# Patient Record
Sex: FEMALE | Race: BLACK OR AFRICAN AMERICAN | NOT HISPANIC OR LATINO | URBAN - METROPOLITAN AREA
[De-identification: names, ages, dates, MRNs, and addresses within clinical notes are randomized per-mention and may not be internally consistent; named-entity substitution may affect disease eponyms.]

---

## 2020-02-02 ENCOUNTER — EMERGENCY (EMERGENCY)
Facility: HOSPITAL | Age: 35
LOS: 1 days | Discharge: HOME | End: 2020-02-02
Attending: STUDENT IN AN ORGANIZED HEALTH CARE EDUCATION/TRAINING PROGRAM
Payer: SELF-PAY

## 2020-02-02 VITALS
OXYGEN SATURATION: 100 % | HEART RATE: 110 BPM | SYSTOLIC BLOOD PRESSURE: 128 MMHG | TEMPERATURE: 103 F | DIASTOLIC BLOOD PRESSURE: 69 MMHG | HEIGHT: 59 IN | WEIGHT: 93.92 LBS | RESPIRATION RATE: 18 BRPM

## 2020-02-02 PROCEDURE — 99284 EMERGENCY DEPT VISIT MOD MDM: CPT

## 2020-02-02 PROCEDURE — 99053 MED SERV 10PM-8AM 24 HR FAC: CPT

## 2020-02-03 LAB
FLU A RESULT: SIGNIFICANT CHANGE UP
FLU A RESULT: SIGNIFICANT CHANGE UP
FLUAV AG NPH QL: SIGNIFICANT CHANGE UP
FLUBV AG NPH QL: SIGNIFICANT CHANGE UP
HCG SERPL-ACNC: <4 MIU/ML — SIGNIFICANT CHANGE UP
RSV RESULT: SIGNIFICANT CHANGE UP
RSV RNA RESP QL NAA+PROBE: SIGNIFICANT CHANGE UP

## 2020-02-03 PROCEDURE — 99284 EMERGENCY DEPT VISIT MOD MDM: CPT | Mod: 25

## 2020-02-03 PROCEDURE — 96374 THER/PROPH/DIAG INJ IV PUSH: CPT

## 2020-02-03 PROCEDURE — 96375 TX/PRO/DX INJ NEW DRUG ADDON: CPT

## 2020-02-03 PROCEDURE — 84702 CHORIONIC GONADOTROPIN TEST: CPT

## 2020-02-03 PROCEDURE — 36415 COLL VENOUS BLD VENIPUNCTURE: CPT

## 2020-02-03 PROCEDURE — 87631 RESP VIRUS 3-5 TARGETS: CPT

## 2020-02-03 RX ORDER — SODIUM CHLORIDE 9 MG/ML
1000 INJECTION INTRAMUSCULAR; INTRAVENOUS; SUBCUTANEOUS ONCE
Refills: 0 | Status: COMPLETED | OUTPATIENT
Start: 2020-02-03 | End: 2020-02-03

## 2020-02-03 RX ORDER — ACETAMINOPHEN 500 MG
975 TABLET ORAL ONCE
Refills: 0 | Status: COMPLETED | OUTPATIENT
Start: 2020-02-03 | End: 2020-02-03

## 2020-02-03 RX ORDER — KETOROLAC TROMETHAMINE 30 MG/ML
30 SYRINGE (ML) INJECTION ONCE
Refills: 0 | Status: DISCONTINUED | OUTPATIENT
Start: 2020-02-03 | End: 2020-02-03

## 2020-02-03 RX ORDER — ONDANSETRON 8 MG/1
4 TABLET, FILM COATED ORAL ONCE
Refills: 0 | Status: COMPLETED | OUTPATIENT
Start: 2020-02-03 | End: 2020-02-03

## 2020-02-03 RX ADMIN — ONDANSETRON 4 MILLIGRAM(S): 8 TABLET, FILM COATED ORAL at 01:53

## 2020-02-03 RX ADMIN — Medication 30 MILLIGRAM(S): at 01:53

## 2020-02-03 RX ADMIN — SODIUM CHLORIDE 1000 MILLILITER(S): 9 INJECTION INTRAMUSCULAR; INTRAVENOUS; SUBCUTANEOUS at 01:53

## 2020-02-03 RX ADMIN — Medication 975 MILLIGRAM(S): at 01:53

## 2020-02-03 NOTE — ED PROVIDER NOTE - CLINICAL SUMMARY MEDICAL DECISION MAKING FREE TEXT BOX
labs reviewed. Pt with likely viral syndrome. Feeling better after toradol, tylenol, ivf.  PT reassured and instructed to f/up with pcp in 1-2 days. instructed to return for worsening fever, worsening vomiting, or any other concerns.  Pt given a copy of all results and instructed to f/up with pcp regarding any abnormal results.

## 2020-02-03 NOTE — ED PROVIDER NOTE - NS ED ROS FT
(+)N/V, (+)Myalgia. No fever/chills, No photophobia/eye pain/changes in vision, No ear pain/sore throat/dysphagia, No chest pain/palpitations, no SOB/cough/wheeze/stridor, No abdominal pain, No diarrhea, no dysuria/frequency/discharge, No neck/back pain, no rash, no changes in neurological status/function.

## 2020-02-03 NOTE — ED PROVIDER NOTE - OBJECTIVE STATEMENT
33 y/o F pt with significant PMHx of presents to the ED c/o diffuse myalgia, N/V and a HA. Pt states she had a total of 3 episodes of emesis and her whole body feels "sore." Pt is unable to sit up because it makes her feel as if she is going to vomit. Denies fever or diarrhea. No further complaints at this time.

## 2020-02-03 NOTE — ED PROVIDER NOTE - PHYSICAL EXAMINATION
Constitutional - well-developed; well nourished.   Head - NCAT. Airway patent.   Eyes - PERRL.   CV - Tachycardic. no murmur. no edema.   Pulm - CTAB.   Abd - soft, nt. no rebound. no guarding.   Neuro - A&Ox3. strength 5/5 x4. sensation intact x4. normal gait.   Skin - No rash.   MSK - normal ROM. Negative Kernig or Brudzinski sign. Constitutional - well-developed; well nourished.   Head - NCAT. Airway patent.   Eyes - PERRL.   CV - Tachycardic. no murmur. no edema.   Pulm - CTAB.   Abd - soft, nt. no rebound. no guarding.   Neuro - A&Ox3. strength 5/5 x4. sensation intact x4. normal gait.   Skin - No rash.   MSK - normal ROM. Negative Kernig and Brudzinski sign.

## 2020-02-03 NOTE — ED PROVIDER NOTE - PATIENT PORTAL LINK FT
You can access the FollowMyHealth Patient Portal offered by [facility] by registering at the following website: [email or web address]. By joining [employer]’s FollowMyHealth portal, you will also be able to view your health information using other applications (apps) compatible with our system.

## 2022-02-02 NOTE — ED ADULT NURSE NOTE - HIV INFORMATION PROVIDED
No (1) Mild-to-moderate dysarthria; patient slurs at least some words and, at worst, can be understood with some difficulty

## 2023-12-20 ENCOUNTER — EMERGENCY (EMERGENCY)
Facility: HOSPITAL | Age: 38
LOS: 0 days | Discharge: HOME | End: 2023-12-20
Attending: EMERGENCY MEDICINE
Payer: COMMERCIAL

## 2023-12-20 VITALS
SYSTOLIC BLOOD PRESSURE: 135 MMHG | TEMPERATURE: 98 F | WEIGHT: 104.94 LBS | OXYGEN SATURATION: 100 % | HEART RATE: 65 BPM | RESPIRATION RATE: 18 BRPM | DIASTOLIC BLOOD PRESSURE: 81 MMHG | HEIGHT: 59 IN

## 2023-12-20 VITALS
TEMPERATURE: 98 F | OXYGEN SATURATION: 100 % | RESPIRATION RATE: 16 BRPM | DIASTOLIC BLOOD PRESSURE: 65 MMHG | HEART RATE: 93 BPM | SYSTOLIC BLOOD PRESSURE: 109 MMHG

## 2023-12-20 DIAGNOSIS — R50.9 FEVER, UNSPECIFIED: ICD-10-CM

## 2023-12-20 DIAGNOSIS — Z20.822 CONTACT WITH AND (SUSPECTED) EXPOSURE TO COVID-19: ICD-10-CM

## 2023-12-20 DIAGNOSIS — R11.2 NAUSEA WITH VOMITING, UNSPECIFIED: ICD-10-CM

## 2023-12-20 DIAGNOSIS — R09.81 NASAL CONGESTION: ICD-10-CM

## 2023-12-20 DIAGNOSIS — B34.9 VIRAL INFECTION, UNSPECIFIED: ICD-10-CM

## 2023-12-20 PROBLEM — Z78.9 OTHER SPECIFIED HEALTH STATUS: Chronic | Status: ACTIVE | Noted: 2020-02-03

## 2023-12-20 LAB
ALBUMIN SERPL ELPH-MCNC: 3.5 G/DL — SIGNIFICANT CHANGE UP (ref 3.3–5)
ALBUMIN SERPL ELPH-MCNC: 3.5 G/DL — SIGNIFICANT CHANGE UP (ref 3.3–5)
ALP SERPL-CCNC: 61 U/L — SIGNIFICANT CHANGE UP (ref 40–120)
ALP SERPL-CCNC: 61 U/L — SIGNIFICANT CHANGE UP (ref 40–120)
ALT FLD-CCNC: 17 U/L — SIGNIFICANT CHANGE UP (ref 12–78)
ALT FLD-CCNC: 17 U/L — SIGNIFICANT CHANGE UP (ref 12–78)
ANION GAP SERPL CALC-SCNC: 4 MMOL/L — LOW (ref 5–17)
ANION GAP SERPL CALC-SCNC: 4 MMOL/L — LOW (ref 5–17)
AST SERPL-CCNC: 13 U/L — LOW (ref 15–37)
AST SERPL-CCNC: 13 U/L — LOW (ref 15–37)
BASOPHILS # BLD AUTO: 0.04 K/UL — SIGNIFICANT CHANGE UP (ref 0–0.2)
BASOPHILS # BLD AUTO: 0.04 K/UL — SIGNIFICANT CHANGE UP (ref 0–0.2)
BASOPHILS NFR BLD AUTO: 0.5 % — SIGNIFICANT CHANGE UP (ref 0–2)
BASOPHILS NFR BLD AUTO: 0.5 % — SIGNIFICANT CHANGE UP (ref 0–2)
BILIRUB SERPL-MCNC: 0.5 MG/DL — SIGNIFICANT CHANGE UP (ref 0.2–1.2)
BILIRUB SERPL-MCNC: 0.5 MG/DL — SIGNIFICANT CHANGE UP (ref 0.2–1.2)
BUN SERPL-MCNC: 7 MG/DL — SIGNIFICANT CHANGE UP (ref 7–23)
BUN SERPL-MCNC: 7 MG/DL — SIGNIFICANT CHANGE UP (ref 7–23)
CALCIUM SERPL-MCNC: 8.8 MG/DL — SIGNIFICANT CHANGE UP (ref 8.5–10.1)
CALCIUM SERPL-MCNC: 8.8 MG/DL — SIGNIFICANT CHANGE UP (ref 8.5–10.1)
CHLORIDE SERPL-SCNC: 105 MMOL/L — SIGNIFICANT CHANGE UP (ref 96–108)
CHLORIDE SERPL-SCNC: 105 MMOL/L — SIGNIFICANT CHANGE UP (ref 96–108)
CO2 SERPL-SCNC: 24 MMOL/L — SIGNIFICANT CHANGE UP (ref 22–31)
CO2 SERPL-SCNC: 24 MMOL/L — SIGNIFICANT CHANGE UP (ref 22–31)
CREAT SERPL-MCNC: 0.66 MG/DL — SIGNIFICANT CHANGE UP (ref 0.5–1.3)
CREAT SERPL-MCNC: 0.66 MG/DL — SIGNIFICANT CHANGE UP (ref 0.5–1.3)
EGFR: 116 ML/MIN/1.73M2 — SIGNIFICANT CHANGE UP
EGFR: 116 ML/MIN/1.73M2 — SIGNIFICANT CHANGE UP
EOSINOPHIL # BLD AUTO: 0.08 K/UL — SIGNIFICANT CHANGE UP (ref 0–0.5)
EOSINOPHIL # BLD AUTO: 0.08 K/UL — SIGNIFICANT CHANGE UP (ref 0–0.5)
EOSINOPHIL NFR BLD AUTO: 1 % — SIGNIFICANT CHANGE UP (ref 0–6)
EOSINOPHIL NFR BLD AUTO: 1 % — SIGNIFICANT CHANGE UP (ref 0–6)
FLUAV AG NPH QL: SIGNIFICANT CHANGE UP
FLUAV AG NPH QL: SIGNIFICANT CHANGE UP
FLUBV AG NPH QL: SIGNIFICANT CHANGE UP
FLUBV AG NPH QL: SIGNIFICANT CHANGE UP
GLUCOSE SERPL-MCNC: 82 MG/DL — SIGNIFICANT CHANGE UP (ref 70–99)
GLUCOSE SERPL-MCNC: 82 MG/DL — SIGNIFICANT CHANGE UP (ref 70–99)
HCT VFR BLD CALC: 36.8 % — SIGNIFICANT CHANGE UP (ref 34.5–45)
HCT VFR BLD CALC: 36.8 % — SIGNIFICANT CHANGE UP (ref 34.5–45)
HGB BLD-MCNC: 13.6 G/DL — SIGNIFICANT CHANGE UP (ref 11.5–15.5)
HGB BLD-MCNC: 13.6 G/DL — SIGNIFICANT CHANGE UP (ref 11.5–15.5)
IMM GRANULOCYTES NFR BLD AUTO: 0.3 % — SIGNIFICANT CHANGE UP (ref 0–0.9)
IMM GRANULOCYTES NFR BLD AUTO: 0.3 % — SIGNIFICANT CHANGE UP (ref 0–0.9)
LYMPHOCYTES # BLD AUTO: 1.47 K/UL — SIGNIFICANT CHANGE UP (ref 1–3.3)
LYMPHOCYTES # BLD AUTO: 1.47 K/UL — SIGNIFICANT CHANGE UP (ref 1–3.3)
LYMPHOCYTES # BLD AUTO: 18.4 % — SIGNIFICANT CHANGE UP (ref 13–44)
LYMPHOCYTES # BLD AUTO: 18.4 % — SIGNIFICANT CHANGE UP (ref 13–44)
MCHC RBC-ENTMCNC: 32.7 PG — SIGNIFICANT CHANGE UP (ref 27–34)
MCHC RBC-ENTMCNC: 32.7 PG — SIGNIFICANT CHANGE UP (ref 27–34)
MCHC RBC-ENTMCNC: 37 GM/DL — HIGH (ref 32–36)
MCHC RBC-ENTMCNC: 37 GM/DL — HIGH (ref 32–36)
MCV RBC AUTO: 88.5 FL — SIGNIFICANT CHANGE UP (ref 80–100)
MCV RBC AUTO: 88.5 FL — SIGNIFICANT CHANGE UP (ref 80–100)
MONOCYTES # BLD AUTO: 0.59 K/UL — SIGNIFICANT CHANGE UP (ref 0–0.9)
MONOCYTES # BLD AUTO: 0.59 K/UL — SIGNIFICANT CHANGE UP (ref 0–0.9)
MONOCYTES NFR BLD AUTO: 7.4 % — SIGNIFICANT CHANGE UP (ref 2–14)
MONOCYTES NFR BLD AUTO: 7.4 % — SIGNIFICANT CHANGE UP (ref 2–14)
NEUTROPHILS # BLD AUTO: 5.8 K/UL — SIGNIFICANT CHANGE UP (ref 1.8–7.4)
NEUTROPHILS # BLD AUTO: 5.8 K/UL — SIGNIFICANT CHANGE UP (ref 1.8–7.4)
NEUTROPHILS NFR BLD AUTO: 72.4 % — SIGNIFICANT CHANGE UP (ref 43–77)
NEUTROPHILS NFR BLD AUTO: 72.4 % — SIGNIFICANT CHANGE UP (ref 43–77)
PLATELET # BLD AUTO: 320 K/UL — SIGNIFICANT CHANGE UP (ref 150–400)
PLATELET # BLD AUTO: 320 K/UL — SIGNIFICANT CHANGE UP (ref 150–400)
POTASSIUM SERPL-MCNC: 3.5 MMOL/L — SIGNIFICANT CHANGE UP (ref 3.5–5.3)
POTASSIUM SERPL-MCNC: 3.5 MMOL/L — SIGNIFICANT CHANGE UP (ref 3.5–5.3)
POTASSIUM SERPL-SCNC: 3.5 MMOL/L — SIGNIFICANT CHANGE UP (ref 3.5–5.3)
POTASSIUM SERPL-SCNC: 3.5 MMOL/L — SIGNIFICANT CHANGE UP (ref 3.5–5.3)
PROT SERPL-MCNC: 7.9 GM/DL — SIGNIFICANT CHANGE UP (ref 6–8.3)
PROT SERPL-MCNC: 7.9 GM/DL — SIGNIFICANT CHANGE UP (ref 6–8.3)
RBC # BLD: 4.16 M/UL — SIGNIFICANT CHANGE UP (ref 3.8–5.2)
RBC # BLD: 4.16 M/UL — SIGNIFICANT CHANGE UP (ref 3.8–5.2)
RBC # FLD: 13.6 % — SIGNIFICANT CHANGE UP (ref 10.3–14.5)
RBC # FLD: 13.6 % — SIGNIFICANT CHANGE UP (ref 10.3–14.5)
RSV RNA NPH QL NAA+NON-PROBE: SIGNIFICANT CHANGE UP
RSV RNA NPH QL NAA+NON-PROBE: SIGNIFICANT CHANGE UP
SARS-COV-2 RNA SPEC QL NAA+PROBE: SIGNIFICANT CHANGE UP
SARS-COV-2 RNA SPEC QL NAA+PROBE: SIGNIFICANT CHANGE UP
SODIUM SERPL-SCNC: 133 MMOL/L — LOW (ref 135–145)
SODIUM SERPL-SCNC: 133 MMOL/L — LOW (ref 135–145)
WBC # BLD: 8 K/UL — SIGNIFICANT CHANGE UP (ref 3.8–10.5)
WBC # BLD: 8 K/UL — SIGNIFICANT CHANGE UP (ref 3.8–10.5)
WBC # FLD AUTO: 8 K/UL — SIGNIFICANT CHANGE UP (ref 3.8–10.5)
WBC # FLD AUTO: 8 K/UL — SIGNIFICANT CHANGE UP (ref 3.8–10.5)

## 2023-12-20 PROCEDURE — 99284 EMERGENCY DEPT VISIT MOD MDM: CPT

## 2023-12-20 PROCEDURE — 80053 COMPREHEN METABOLIC PANEL: CPT

## 2023-12-20 PROCEDURE — 0241U: CPT

## 2023-12-20 PROCEDURE — 85025 COMPLETE CBC W/AUTO DIFF WBC: CPT

## 2023-12-20 PROCEDURE — 36415 COLL VENOUS BLD VENIPUNCTURE: CPT

## 2023-12-20 PROCEDURE — 96375 TX/PRO/DX INJ NEW DRUG ADDON: CPT

## 2023-12-20 PROCEDURE — 99284 EMERGENCY DEPT VISIT MOD MDM: CPT | Mod: 25

## 2023-12-20 PROCEDURE — 96374 THER/PROPH/DIAG INJ IV PUSH: CPT

## 2023-12-20 RX ORDER — ONDANSETRON 8 MG/1
4 TABLET, FILM COATED ORAL ONCE
Refills: 0 | Status: COMPLETED | OUTPATIENT
Start: 2023-12-20 | End: 2023-12-20

## 2023-12-20 RX ORDER — ONDANSETRON 8 MG/1
1 TABLET, FILM COATED ORAL
Qty: 20 | Refills: 0
Start: 2023-12-20

## 2023-12-20 RX ORDER — SODIUM CHLORIDE 9 MG/ML
1000 INJECTION INTRAMUSCULAR; INTRAVENOUS; SUBCUTANEOUS ONCE
Refills: 0 | Status: COMPLETED | OUTPATIENT
Start: 2023-12-20 | End: 2023-12-20

## 2023-12-20 RX ADMIN — ONDANSETRON 4 MILLIGRAM(S): 8 TABLET, FILM COATED ORAL at 13:31

## 2023-12-20 RX ADMIN — Medication 125 MILLIGRAM(S): at 13:31

## 2023-12-20 RX ADMIN — SODIUM CHLORIDE 1000 MILLILITER(S): 9 INJECTION INTRAMUSCULAR; INTRAVENOUS; SUBCUTANEOUS at 13:35

## 2023-12-20 NOTE — ED STATDOCS - PHYSICAL EXAMINATION
Constitutional: NAD AOx3 thin, mildly ill appearing  Eyes: PERRL EOMI  Head: Normocephalic atraumatic  Mouth: MMM  Cardiac: regular rate and rhythm  Resp: Lungs CTAB  GI: Abd s/nd/nt  Neuro: CN2-12 grossly intact, OSORIO x 4  Skin: No visible rashes

## 2023-12-20 NOTE — ED STATDOCS - PATIENT PORTAL LINK FT
You can access the FollowMyHealth Patient Portal offered by [facility] by registering at the following website: [email or web address]. By joining [employer]’s FollowMyHealth portal, you will also be able to view your health information using other applications (apps) compatible with our system. You can access the FollowMyHealth Patient Portal offered by [facility] by registering at the following website: [email or web address]. By joining [employer]’s FollowMyHealth portal, you will also be able to view your health information using other applications (apps) compatible with our system.

## 2023-12-20 NOTE — ED STATDOCS - CLINICAL SUMMARY MEDICAL DECISION MAKING FREE TEXT BOX
pt with viral syndrome, benign abdomen, will ck labs, give IVFS, IV zofran, RVP and reeval. denies dv

## 2023-12-20 NOTE — ED ADULT NURSE NOTE - NSFALLUNIVINTERV_ED_ALL_ED
Bed/Stretcher in lowest position, wheels locked, appropriate side rails in place/Call bell, personal items and telephone in reach/Instruct patient to call for assistance before getting out of bed/chair/stretcher/Non-slip footwear applied when patient is off stretcher/[place] to call system/Physically safe environment - no spills, clutter or unnecessary equipment/Purposeful proactive rounding/Room/bathroom lighting operational, light cord in reach Bed/Stretcher in lowest position, wheels locked, appropriate side rails in place/Call bell, personal items and telephone in reach/Instruct patient to call for assistance before getting out of bed/chair/stretcher/Non-slip footwear applied when patient is off stretcher/[place] to call system/Physically safe environment - no spills, clutter or unnecessary equipment/Purposeful proactive rounding/Room/bathroom lighting operational, light cord in reach

## 2023-12-20 NOTE — ED STATDOCS - PROGRESS NOTE DETAILS
39 y/o F with no PMH presents with "brain fog", nausea/vomiting, subjective fever, nasal congestion, general malaise. Went to outside , negative for flu/covid yesterday. 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 PE; Well appearing. Cardiac: s1s2, RRR. lungs: CTAB. Abdomen: NBS x4, soft, nontender. A/P: Likely viral syndrome, plan for symptomatic care, labs, IVF, reassess. - Devante Clayton PA-C 37 y/o F with no PMH presents with "brain fog", nausea/vomiting, subjective fever, nasal congestion, general malaise. Went to outside , negative for flu/covid yesterday. 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 PE; Well appearing. Cardiac: s1s2, RRR. lungs: CTAB. Abdomen: NBS x4, soft, nontender. A/P: Likely viral syndrome, plan for symptomatic care, labs, IVF, reassess. - Devante Clayton PA-C Pt tolerating PO. WIll dc home. - Devante Clayton PA-C

## 2023-12-20 NOTE — ED ADULT NURSE NOTE - OBJECTIVE STATEMENT
pt presenting to the ed c/o flu like symptoms x 2 wks. pt states chills, coughs, vomiting, sob in the morning, headaches and dizziness. pt denies recent fevers. pt seen at  and tested neg for rvp.

## 2023-12-20 NOTE — ED STATDOCS - OBJECTIVE STATEMENT
39 yo f with no pmhx, with few days of feeling ill, n/v, dec energy, and only able to tolerate po liquids. subj fevers. +nasal congestion. seen at  yesterday, neg flu/covid. No cough, cp or sob. No abd pain. 2 c-sections. Head feels "cloudy." 37 yo f with no pmhx, with few days of feeling ill, n/v, dec energy, and only able to tolerate po liquids. subj fevers. +nasal congestion. seen at  yesterday, neg flu/covid. No cough, cp or sob. No abd pain. 2 c-sections. Head feels "cloudy."

## 2024-02-21 ENCOUNTER — INPATIENT (INPATIENT)
Facility: HOSPITAL | Age: 39
LOS: 1 days | Discharge: HOME | DRG: 566 | End: 2024-02-23
Attending: OBSTETRICS & GYNECOLOGY | Admitting: OBSTETRICS & GYNECOLOGY
Payer: MEDICAID

## 2024-02-21 VITALS
HEIGHT: 59 IN | HEART RATE: 85 BPM | SYSTOLIC BLOOD PRESSURE: 130 MMHG | RESPIRATION RATE: 20 BRPM | OXYGEN SATURATION: 100 % | WEIGHT: 111.99 LBS | DIASTOLIC BLOOD PRESSURE: 68 MMHG | TEMPERATURE: 98 F

## 2024-02-21 LAB
ADD ON TEST-SPECIMEN IN LAB: SIGNIFICANT CHANGE UP
ALBUMIN SERPL ELPH-MCNC: 3.5 G/DL — SIGNIFICANT CHANGE UP (ref 3.3–5)
ALP SERPL-CCNC: 78 U/L — SIGNIFICANT CHANGE UP (ref 40–120)
ALT FLD-CCNC: 45 U/L — SIGNIFICANT CHANGE UP (ref 12–78)
ANION GAP SERPL CALC-SCNC: 3 MMOL/L — LOW (ref 5–17)
APPEARANCE UR: ABNORMAL
AST SERPL-CCNC: 19 U/L — SIGNIFICANT CHANGE UP (ref 15–37)
BACTERIA # UR AUTO: ABNORMAL /HPF
BASOPHILS # BLD AUTO: 0.05 K/UL — SIGNIFICANT CHANGE UP (ref 0–0.2)
BASOPHILS NFR BLD AUTO: 0.4 % — SIGNIFICANT CHANGE UP (ref 0–2)
BILIRUB SERPL-MCNC: 0.3 MG/DL — SIGNIFICANT CHANGE UP (ref 0.2–1.2)
BILIRUB UR-MCNC: NEGATIVE — SIGNIFICANT CHANGE UP
BUN SERPL-MCNC: 10 MG/DL — SIGNIFICANT CHANGE UP (ref 7–23)
CALCIUM SERPL-MCNC: 9 MG/DL — SIGNIFICANT CHANGE UP (ref 8.5–10.1)
CHLORIDE SERPL-SCNC: 110 MMOL/L — HIGH (ref 96–108)
CK SERPL-CCNC: 105 U/L — SIGNIFICANT CHANGE UP (ref 26–192)
CO2 SERPL-SCNC: 26 MMOL/L — SIGNIFICANT CHANGE UP (ref 22–31)
COLOR SPEC: YELLOW — SIGNIFICANT CHANGE UP
CREAT SERPL-MCNC: 0.69 MG/DL — SIGNIFICANT CHANGE UP (ref 0.5–1.3)
DIFF PNL FLD: ABNORMAL
EGFR: 115 ML/MIN/1.73M2 — SIGNIFICANT CHANGE UP
EOSINOPHIL # BLD AUTO: 0.02 K/UL — SIGNIFICANT CHANGE UP (ref 0–0.5)
EOSINOPHIL NFR BLD AUTO: 0.2 % — SIGNIFICANT CHANGE UP (ref 0–6)
GLUCOSE SERPL-MCNC: 118 MG/DL — HIGH (ref 70–99)
GLUCOSE UR QL: NEGATIVE MG/DL — SIGNIFICANT CHANGE UP
HCG SERPL-ACNC: 58 MIU/ML — HIGH
HCT VFR BLD CALC: 35.8 % — SIGNIFICANT CHANGE UP (ref 34.5–45)
HGB BLD-MCNC: 12.5 G/DL — SIGNIFICANT CHANGE UP (ref 11.5–15.5)
IMM GRANULOCYTES NFR BLD AUTO: 0.4 % — SIGNIFICANT CHANGE UP (ref 0–0.9)
KETONES UR-MCNC: NEGATIVE MG/DL — SIGNIFICANT CHANGE UP
LACTATE SERPL-SCNC: 1.1 MMOL/L — SIGNIFICANT CHANGE UP (ref 0.7–2)
LEUKOCYTE ESTERASE UR-ACNC: ABNORMAL
LYMPHOCYTES # BLD AUTO: 1.1 K/UL — SIGNIFICANT CHANGE UP (ref 1–3.3)
LYMPHOCYTES # BLD AUTO: 8.8 % — LOW (ref 13–44)
MCHC RBC-ENTMCNC: 32.1 PG — SIGNIFICANT CHANGE UP (ref 27–34)
MCHC RBC-ENTMCNC: 34.9 GM/DL — SIGNIFICANT CHANGE UP (ref 32–36)
MCV RBC AUTO: 91.8 FL — SIGNIFICANT CHANGE UP (ref 80–100)
MONOCYTES # BLD AUTO: 0.28 K/UL — SIGNIFICANT CHANGE UP (ref 0–0.9)
MONOCYTES NFR BLD AUTO: 2.2 % — SIGNIFICANT CHANGE UP (ref 2–14)
NEUTROPHILS # BLD AUTO: 11.07 K/UL — HIGH (ref 1.8–7.4)
NEUTROPHILS NFR BLD AUTO: 88 % — HIGH (ref 43–77)
NITRITE UR-MCNC: POSITIVE
PH UR: 6 — SIGNIFICANT CHANGE UP (ref 5–8)
PLATELET # BLD AUTO: 347 K/UL — SIGNIFICANT CHANGE UP (ref 150–400)
POCT URINE PREGNANCY TEST: POSITIVE
POTASSIUM SERPL-MCNC: 4 MMOL/L — SIGNIFICANT CHANGE UP (ref 3.5–5.3)
POTASSIUM SERPL-SCNC: 4 MMOL/L — SIGNIFICANT CHANGE UP (ref 3.5–5.3)
PROT SERPL-MCNC: 7.8 GM/DL — SIGNIFICANT CHANGE UP (ref 6–8.3)
PROT UR-MCNC: NEGATIVE MG/DL — SIGNIFICANT CHANGE UP
RBC # BLD: 3.9 M/UL — SIGNIFICANT CHANGE UP (ref 3.8–5.2)
RBC # FLD: 12.8 % — SIGNIFICANT CHANGE UP (ref 10.3–14.5)
RBC CASTS # UR COMP ASSIST: 1 /HPF — SIGNIFICANT CHANGE UP (ref 0–4)
SODIUM SERPL-SCNC: 139 MMOL/L — SIGNIFICANT CHANGE UP (ref 135–145)
SP GR SPEC: 1.02 — SIGNIFICANT CHANGE UP (ref 1–1.03)
SQUAMOUS # UR AUTO: 6 /HPF — HIGH (ref 0–5)
UROBILINOGEN FLD QL: 1 MG/DL — SIGNIFICANT CHANGE UP (ref 0.2–1)
WBC # BLD: 12.57 K/UL — HIGH (ref 3.8–10.5)
WBC # FLD AUTO: 12.57 K/UL — HIGH (ref 3.8–10.5)
WBC UR QL: 36 /HPF — HIGH (ref 0–5)

## 2024-02-21 PROCEDURE — 99285 EMERGENCY DEPT VISIT HI MDM: CPT

## 2024-02-21 RX ORDER — MORPHINE SULFATE 50 MG/1
4 CAPSULE, EXTENDED RELEASE ORAL ONCE
Refills: 0 | Status: DISCONTINUED | OUTPATIENT
Start: 2024-02-21 | End: 2024-02-21

## 2024-02-21 RX ORDER — KETOROLAC TROMETHAMINE 30 MG/ML
15 SYRINGE (ML) INJECTION ONCE
Refills: 0 | Status: DISCONTINUED | OUTPATIENT
Start: 2024-02-21 | End: 2024-02-21

## 2024-02-21 RX ORDER — ONDANSETRON 8 MG/1
4 TABLET, FILM COATED ORAL ONCE
Refills: 0 | Status: COMPLETED | OUTPATIENT
Start: 2024-02-21 | End: 2024-02-21

## 2024-02-21 RX ORDER — CEFTRIAXONE 500 MG/1
1000 INJECTION, POWDER, FOR SOLUTION INTRAMUSCULAR; INTRAVENOUS ONCE
Refills: 0 | Status: COMPLETED | OUTPATIENT
Start: 2024-02-21 | End: 2024-02-21

## 2024-02-21 RX ORDER — ACETAMINOPHEN 500 MG
1000 TABLET ORAL ONCE
Refills: 0 | Status: COMPLETED | OUTPATIENT
Start: 2024-02-21 | End: 2024-02-21

## 2024-02-21 RX ORDER — SODIUM CHLORIDE 9 MG/ML
1000 INJECTION INTRAMUSCULAR; INTRAVENOUS; SUBCUTANEOUS ONCE
Refills: 0 | Status: COMPLETED | OUTPATIENT
Start: 2024-02-21 | End: 2024-02-21

## 2024-02-21 RX ADMIN — Medication 400 MILLIGRAM(S): at 23:33

## 2024-02-21 RX ADMIN — ONDANSETRON 4 MILLIGRAM(S): 8 TABLET, FILM COATED ORAL at 23:11

## 2024-02-21 RX ADMIN — CEFTRIAXONE 1000 MILLIGRAM(S): 500 INJECTION, POWDER, FOR SOLUTION INTRAMUSCULAR; INTRAVENOUS at 23:40

## 2024-02-21 RX ADMIN — SODIUM CHLORIDE 2000 MILLILITER(S): 9 INJECTION INTRAMUSCULAR; INTRAVENOUS; SUBCUTANEOUS at 23:11

## 2024-02-21 NOTE — ED PROVIDER NOTE - CLINICAL SUMMARY MEDICAL DECISION MAKING FREE TEXT BOX
38-year-old female with no pertinent past medical history and surgical history of 2 C-sections presents for evaluation severe left lower extremity pain starting at the thigh now radiating to the groin and left side of the abdomen as well as the left lower back progressively worsening over the past 2 to 3 days.  Patient denies any  trauma but notes heavy lifting of boxes at her job.  Patient denies any sudden onset of pain while she was lifting boxes but states that the pain started shortly after she got home from work a few nights ago.  Patient also notes some dysuria and frequency.  Patient denies any diarrhea.  Patient denies any bright red blood per rectum or melena.  Patient is afebrile.  Patient was seen at urgent care but sent directly from urgent care via EMS without workup.  There was concern for DVT at urgent care.  DDx includes ovarian torsion, PID, diverticulitis, pyelonephritis, ureterolithiasis.  Will get CBC, CMP, UA/Cx, HCG and CTAP.  Pt found to have + urine HCG so quantitative HCG ordered and CTAP initially cancelled.  Pelvic and kidney US ordered which showed uterine mass.  Pt having significant pain after several doses of pain medications given.  OB/gyne consulted for further eval and CTAP ordered.  Case signed out to Dr. Hill in AM to f/u CT and contact gyne resident.

## 2024-02-21 NOTE — ED ADULT NURSE NOTE - OBJECTIVE STATEMENT
pt presenting to the ed c/o sudden onset L left pain radiating to LLQ x today. pt tearful and states pain is getting worse since being here 10/10. denies recent falls, traumas- states for work she lifts heavy objects but denies recent injuries. pt endorsing abd tenderness. denies n/v/d, fevers, chills or recent infections. pt endorsing frequent urination but denies other gu symptoms.

## 2024-02-21 NOTE — ED ADULT NURSE NOTE - NSFALLUNIVINTERV_ED_ALL_ED
Bed/Stretcher in lowest position, wheels locked, appropriate side rails in place/Call bell, personal items and telephone in reach/Instruct patient to call for assistance before getting out of bed/chair/stretcher/Non-slip footwear applied when patient is off stretcher/[place] to call system/Physically safe environment - no spills, clutter or unnecessary equipment/Purposeful proactive rounding/Room/bathroom lighting operational, light cord in reach

## 2024-02-21 NOTE — ED PROVIDER NOTE - NSICDXPASTMEDICALHX_GEN_ALL_CORE_FT
Vaccine Information Statement(s) was given today. This has been reviewed, questions answered, and verbal consent given by Patient for injection(s) and administration of Tetanus/Diphtheria (Td) .    Patient tolerated without incident. See immunization grid for documentation.         PAST MEDICAL HISTORY:  No pertinent past medical history

## 2024-02-21 NOTE — ED PROVIDER NOTE - OBJECTIVE STATEMENT
38-year-old female with no pertinent past medical history and surgical history of 2 C-sections presents for evaluation severe left lower extremity pain starting at the thigh now radiating to the groin and left side of the abdomen as well as the left lower back progressively worsening over the past 2 to 3 days.  Patient denies any  trauma but notes heavy lifting of boxes at her job.  Patient denies any sudden onset of pain while she was lifting boxes but states that the pain started shortly after she got home from work a few nights ago.  Patient also notes some dysuria and frequency.  Patient denies any diarrhea.  Patient denies any bright red blood per rectum or melena.  Patient is afebrile.  Patient was seen at urgent care but sent directly from urgent care via EMS without workup.  There was concern for DVT at urgent care.

## 2024-02-21 NOTE — ED ADULT TRIAGE NOTE - CHIEF COMPLAINT QUOTE
Pt BIBEMS co Left upper leg pain starting today. As per EMS, pt SIB [place] MD for possible blood clot as pt complaining of left leg pain in lower thigh radiating to lower back to Left arm. Pt lifts heavy objects at work. Pt also endorsing frequent urination with slight dysuria and nausea. Pt denies CP, SOB, vomiting. AOx4, NKDA.

## 2024-02-21 NOTE — ED PROVIDER NOTE - PROGRESS NOTE DETAILS
Case has been discussed with OB/GYN physician Dr. Ramachandran who has evaluated the patient at the bedside.  They are requesting CT of the abdomen pelvis with IV contrast given that this does not appear to be viable pregnancy and that the benefits outweigh the risks.  Obi Royal, DO

## 2024-02-22 DIAGNOSIS — N85.8 OTHER SPECIFIED NONINFLAMMATORY DISORDERS OF UTERUS: ICD-10-CM

## 2024-02-22 LAB
C TRACH RRNA SPEC QL NAA+PROBE: SIGNIFICANT CHANGE UP
CANDIDA AB TITR SER: SIGNIFICANT CHANGE UP
G VAGINALIS DNA SPEC QL NAA+PROBE: DETECTED
N GONORRHOEA RRNA SPEC QL NAA+PROBE: SIGNIFICANT CHANGE UP
T VAGINALIS SPEC QL WET PREP: SIGNIFICANT CHANGE UP

## 2024-02-22 PROCEDURE — 86900 BLOOD TYPING SEROLOGIC ABO: CPT

## 2024-02-22 PROCEDURE — 86880 COOMBS TEST DIRECT: CPT

## 2024-02-22 PROCEDURE — 80048 BASIC METABOLIC PNL TOTAL CA: CPT

## 2024-02-22 PROCEDURE — 85027 COMPLETE CBC AUTOMATED: CPT

## 2024-02-22 PROCEDURE — 74177 CT ABD & PELVIS W/CONTRAST: CPT | Mod: 26,MA

## 2024-02-22 PROCEDURE — 76770 US EXAM ABDO BACK WALL COMP: CPT | Mod: 26

## 2024-02-22 PROCEDURE — 76817 TRANSVAGINAL US OBSTETRIC: CPT | Mod: 26

## 2024-02-22 PROCEDURE — 84702 CHORIONIC GONADOTROPIN TEST: CPT

## 2024-02-22 PROCEDURE — A9579: CPT

## 2024-02-22 PROCEDURE — 86850 RBC ANTIBODY SCREEN: CPT

## 2024-02-22 PROCEDURE — 72197 MRI PELVIS W/O & W/DYE: CPT

## 2024-02-22 PROCEDURE — 36415 COLL VENOUS BLD VENIPUNCTURE: CPT

## 2024-02-22 PROCEDURE — 86077 PHYS BLOOD BANK SERV XMATCH: CPT

## 2024-02-22 PROCEDURE — 86901 BLOOD TYPING SEROLOGIC RH(D): CPT

## 2024-02-22 PROCEDURE — 72197 MRI PELVIS W/O & W/DYE: CPT | Mod: 26

## 2024-02-22 PROCEDURE — 86870 RBC ANTIBODY IDENTIFICATION: CPT

## 2024-02-22 PROCEDURE — 93971 EXTREMITY STUDY: CPT | Mod: 26,LT

## 2024-02-22 PROCEDURE — 99222 1ST HOSP IP/OBS MODERATE 55: CPT | Mod: GC

## 2024-02-22 RX ORDER — PIPERACILLIN AND TAZOBACTAM 4; .5 G/20ML; G/20ML
3.38 INJECTION, POWDER, LYOPHILIZED, FOR SOLUTION INTRAVENOUS EVERY 8 HOURS
Refills: 0 | Status: DISCONTINUED | OUTPATIENT
Start: 2024-02-22 | End: 2024-02-23

## 2024-02-22 RX ORDER — PIPERACILLIN AND TAZOBACTAM 4; .5 G/20ML; G/20ML
3.38 INJECTION, POWDER, LYOPHILIZED, FOR SOLUTION INTRAVENOUS ONCE
Refills: 0 | Status: COMPLETED | OUTPATIENT
Start: 2024-02-22 | End: 2024-02-22

## 2024-02-22 RX ORDER — MORPHINE SULFATE 50 MG/1
4 CAPSULE, EXTENDED RELEASE ORAL ONCE
Refills: 0 | Status: DISCONTINUED | OUTPATIENT
Start: 2024-02-22 | End: 2024-02-22

## 2024-02-22 RX ORDER — HYDROMORPHONE HYDROCHLORIDE 2 MG/ML
0.5 INJECTION INTRAMUSCULAR; INTRAVENOUS; SUBCUTANEOUS ONCE
Refills: 0 | Status: DISCONTINUED | OUTPATIENT
Start: 2024-02-22 | End: 2024-02-22

## 2024-02-22 RX ORDER — OXYCODONE HYDROCHLORIDE 5 MG/1
5 TABLET ORAL
Refills: 0 | Status: DISCONTINUED | OUTPATIENT
Start: 2024-02-22 | End: 2024-02-23

## 2024-02-22 RX ORDER — KETOROLAC TROMETHAMINE 30 MG/ML
30 SYRINGE (ML) INJECTION EVERY 8 HOURS
Refills: 0 | Status: DISCONTINUED | OUTPATIENT
Start: 2024-02-22 | End: 2024-02-23

## 2024-02-22 RX ORDER — SODIUM CHLORIDE 9 MG/ML
1000 INJECTION, SOLUTION INTRAVENOUS
Refills: 0 | Status: DISCONTINUED | OUTPATIENT
Start: 2024-02-22 | End: 2024-02-23

## 2024-02-22 RX ORDER — MORPHINE SULFATE 50 MG/1
2 CAPSULE, EXTENDED RELEASE ORAL ONCE
Refills: 0 | Status: DISCONTINUED | OUTPATIENT
Start: 2024-02-22 | End: 2024-02-22

## 2024-02-22 RX ORDER — ONDANSETRON 8 MG/1
4 TABLET, FILM COATED ORAL ONCE
Refills: 0 | Status: COMPLETED | OUTPATIENT
Start: 2024-02-22 | End: 2024-02-22

## 2024-02-22 RX ORDER — KETOROLAC TROMETHAMINE 30 MG/ML
15 SYRINGE (ML) INJECTION ONCE
Refills: 0 | Status: DISCONTINUED | OUTPATIENT
Start: 2024-02-22 | End: 2024-02-22

## 2024-02-22 RX ORDER — ACETAMINOPHEN 500 MG
1000 TABLET ORAL EVERY 6 HOURS
Refills: 0 | Status: DISCONTINUED | OUTPATIENT
Start: 2024-02-22 | End: 2024-02-23

## 2024-02-22 RX ORDER — SODIUM CHLORIDE 9 MG/ML
1000 INJECTION, SOLUTION INTRAVENOUS
Refills: 0 | Status: DISCONTINUED | OUTPATIENT
Start: 2024-02-22 | End: 2024-02-22

## 2024-02-22 RX ORDER — INFLUENZA VIRUS VACCINE 15; 15; 15; 15 UG/.5ML; UG/.5ML; UG/.5ML; UG/.5ML
0.5 SUSPENSION INTRAMUSCULAR ONCE
Refills: 0 | Status: DISCONTINUED | OUTPATIENT
Start: 2024-02-22 | End: 2024-02-23

## 2024-02-22 RX ORDER — SODIUM CHLORIDE 9 MG/ML
1000 INJECTION INTRAMUSCULAR; INTRAVENOUS; SUBCUTANEOUS
Refills: 0 | Status: DISCONTINUED | OUTPATIENT
Start: 2024-02-22 | End: 2024-02-22

## 2024-02-22 RX ADMIN — PIPERACILLIN AND TAZOBACTAM 200 GRAM(S): 4; .5 INJECTION, POWDER, LYOPHILIZED, FOR SOLUTION INTRAVENOUS at 10:03

## 2024-02-22 RX ADMIN — Medication 30 MILLIGRAM(S): at 21:15

## 2024-02-22 RX ADMIN — Medication 1000 MILLIGRAM(S): at 18:56

## 2024-02-22 RX ADMIN — Medication 15 MILLIGRAM(S): at 05:36

## 2024-02-22 RX ADMIN — MORPHINE SULFATE 4 MILLIGRAM(S): 50 CAPSULE, EXTENDED RELEASE ORAL at 03:17

## 2024-02-22 RX ADMIN — MORPHINE SULFATE 4 MILLIGRAM(S): 50 CAPSULE, EXTENDED RELEASE ORAL at 04:51

## 2024-02-22 RX ADMIN — SODIUM CHLORIDE 125 MILLILITER(S): 9 INJECTION, SOLUTION INTRAVENOUS at 10:02

## 2024-02-22 RX ADMIN — MORPHINE SULFATE 2 MILLIGRAM(S): 50 CAPSULE, EXTENDED RELEASE ORAL at 10:01

## 2024-02-22 RX ADMIN — PIPERACILLIN AND TAZOBACTAM 25 GRAM(S): 4; .5 INJECTION, POWDER, LYOPHILIZED, FOR SOLUTION INTRAVENOUS at 18:04

## 2024-02-22 RX ADMIN — HYDROMORPHONE HYDROCHLORIDE 0.5 MILLIGRAM(S): 2 INJECTION INTRAMUSCULAR; INTRAVENOUS; SUBCUTANEOUS at 05:36

## 2024-02-22 RX ADMIN — Medication 1000 MILLIGRAM(S): at 18:52

## 2024-02-22 NOTE — H&P ADULT - ASSESSMENT
38y   Last Menstrual Period 2024, s/p D&E @ 16 wk GA on   presents with acute pelvic pain described at pelvic pressure.    A/P:  - VSS  - Hg 12.5, WBC 12.5  - bHC  - SSE significant for CMT and watery/bloody malododorous discharge; GC/CT and affirm vaginal swabs obtained  - TVUS: Rounded echogenic mass like structure within the uterus which appears to be within the endometrium measuring up to 3.4 x 3.1 x 2.8 cm.  - Suspicious for aborting myoma. Discussed with patient conservative management vs surgical. Discussed pain control and observation. Also discussed surgical management with D&C hysteroscopy, however due to recent instrumentation and pregnancy, increased risk for bleeding, infection and perforation.  - Patient would like to stay for observation due to increased pain. May be considering procedure if pain worsens.  - Plan for pain control with Toradol, Tylenol and Oxycodone prn.  - Keep Type and screen active.  - Will call to add to OR schedule for tomorrow, .  - NPO after midnight.  - However, if condition worsens, may need to complete procedure today.   - Patient verbalized understanding and agrees with plan at this time. All questions answered.    D/w and seen at bedside with Dr. Latonya Gross MD1   38y   Last Menstrual Period 2024, s/p D&E @ 16 wk GA on   presents with acute pelvic pain described as pelvic pressure concern for aborting myoma.    A/P:  - VSS  - Hg 12.5, WBC 12.5  - bHC  - SSE significant for CMT and watery/bloody malododorous discharge; GC/CT and affirm vaginal swabs obtained  - TVUS: Rounded echogenic mass like structure within the uterus which appears to be within the endometrium measuring up to 3.4 x 3.1 x 2.8 cm.  - Suspicious for aborting myoma. Discussed with patient conservative management vs surgical. Discussed pain control and observation. Also discussed surgical management with D&C hysteroscopy, however due to recent instrumentation and pregnancy, increased risk for bleeding, infection and perforation.  - Patient would like to stay for observation due to increased pain. May be considering procedure if pain worsens.  - Plan for pain control with Toradol, Tylenol and Oxycodone prn.  - IV abx due to concern for necrosis with Zosyn prn.   - Keep Type and screen active.  - Will call to add to OR schedule for tomorrow, .  - NPO after midnight.  - However, if condition worsens, may need to complete procedure today.   - Patient verbalized understanding and agrees with plan at this time. All questions answered.    D/w and seen at bedside with Dr. Latonya Gross MD1   38y   Last Menstrual Period 2024, s/p D&E @ 16 wk GA on   presents with acute pelvic pain described as pelvic pressure concern for aborting myoma.    A/P:  - VSS  - Hg 12.5, WBC 12.5  - bHC  - SSE significant for CMT and watery/bloody malododorous discharge; GC/CT and affirm vaginal swabs obtained  - TVUS: Rounded echogenic mass like structure within the uterus which appears to be within the endometrium measuring up to 3.4 x 3.1 x 2.8 cm.  - Suspicious for aborting myoma. Discussed with patient conservative management vs surgical. Discussed pain control and observation. Also discussed surgical management with D&C hysteroscopy, however due to recent instrumentation and pregnancy, increased risk for bleeding, infection and uterine perforation.  - Patient would like to stay for observation due to increased pain. May be considering procedure if pain worsens.  - Plan for pain control with Toradol, Tylenol and Oxycodone prn.  - IV abx due to concern for necrosis with Zosyn prn.   - Keep Type and screen active.  - Will call to add to OR schedule for tomorrow, .  - NPO after midnight.  - However, if condition worsens, may need to complete procedure today.   - Patient verbalized understanding and agrees with plan at this time. All questions answered.    D/w and seen at bedside with Dr. Latonya Gross MD1

## 2024-02-22 NOTE — CONSULT NOTE ADULT - ASSESSMENT
38y   Last Menstrual Period 2024, presents with severe left pelvic pain and vaginal bleeding for 3 days.    A/P:  -VSS, afebrile  -CBC with mildly elevated WBC 12.  -UA nitrite positive indicative of UTI. Patient received Rocephin while in ED.  -HCG noted to be positive at 58. Patient was unaware she was pregnant. Reports using condoms for contraception.  -No IUP or adnexal pregnancy on sono. Pregnancy currently on unknown location. Recommend repeat bHCG in 2 days and repeat sono in 1 week.  -TVUS noted to have endometrial mass measuring 3.4 x 3.1 x 2.8 cm. Suspicious for endometrial mass vs submucosal fibroid.  -Patient with severe pain at bedside. She received Toradol with improvement in pain.  -Due to clinical presentation recommend CT scan to evaluate any other etiologies for left pelvic pain.  -Will continue to follow for pregnancy of unknown location vs aborting myoma.

## 2024-02-22 NOTE — H&P ADULT - HISTORY OF PRESENT ILLNESS
HPI:     38y   Last Menstrual Period 2024, presents with severe pelvic pain that started 3 days ago and acutely worsened. Patient also reports vaginal bleeding for 3 days only requiring 1 pad per day. Patient describes the pain as pressure and " something pushing". She also states when the pain is sharp it shoots from her pelvis down to her leg. Of note, patient has a D&E procedure for [facility] ab [facility]  and . She states pain was tolerable after procedure and bleeding minimal. The pain flared 2 days ago with no inciting event.     Patient reports pressure when urinating and frequency. Denies nausea, vomiting, diarrhea, fevers, chills, lightheadedness, dizziness, SOB, CP and dysuria.    PMHX; denies  PSHX; Csection x2, D&E 24  POBHX; Csecx2 (, ),  sab with medication, induced ab surgical with D&E  PGYNHX: History of bartholin cysts many years ago. Denies history of fibroids, ovarian cysts, STIs; Not on contraceptive currently; States hx of painful periods since adolescence. Follows [facility] Dr. Charles in [facility]   SOCIAL: Denies use of alcohol, tobacco, or illicit drugs  Allergies: No Known Allergies  MEDS: denies      MEDS:  acetaminophen     Tablet .. 1000 milliGRAM(s) Oral every 6 hours  ketorolac   Injectable 30 milliGRAM(s) IV Push every 8 hours  lactated ringers. 1000 milliLiter(s) IV Continuous <Continuous>  morphine  - Injectable 2 milliGRAM(s) IV Push once  oxyCODONE    IR 5 milliGRAM(s) Oral every 3 hours PRN  piperacillin/tazobactam IVPB. 3.375 Gram(s) IV Intermittent once  piperacillin/tazobactam IVPB.- 3.375 Gram(s) IV Intermittent once  piperacillin/tazobactam IVPB.. 3.375 Gram(s) IV Intermittent every    HPI:     38y   Last Menstrual Period 2024, presents with severe pelvic pain that started 3 days ago and acutely worsened. Patient also reports vaginal bleeding for 3 days only requiring 1 pad per day. Patient describes the pain as pressure and " something pushing". She also states when the pain is sharp it shoots from her pelvis down to her leg. Of note, patient has a D&E procedure for [facility] ab [facility]  ([facility]) and  (D&E). She states pain was tolerable after procedure and bleeding minimal. The pain flared 2 days ago with no inciting event.     Patient reports pressure when urinating and frequency. Denies nausea, vomiting, diarrhea, fevers, chills, lightheadedness, dizziness, SOB, CP and dysuria.    PMHX; denies  PSHX; Csection x2, D&E 24  POBHX; Csecx2 (, ),  sab with medication, induced ab surgical with D&E  PGYNHX: History of bartholin cysts many years ago. Denies history of fibroids, ovarian cysts, STIs; Not on contraceptive currently; States hx of painful periods since adolescence. Follows with Dr. Charles in [facility]   SOCIAL: Denies use of alcohol, tobacco, or illicit drugs  Allergies: No Known Allergies  MEDS: denies      MEDS:  acetaminophen     Tablet .. 1000 milliGRAM(s) Oral every 6 hours  ketorolac   Injectable 30 milliGRAM(s) IV Push every 8 hours  lactated ringers. 1000 milliLiter(s) IV Continuous <Continuous>  morphine  - Injectable 2 milliGRAM(s) IV Push once  oxyCODONE    IR 5 milliGRAM(s) Oral every 3 hours PRN  piperacillin/tazobactam IVPB. 3.375 Gram(s) IV Intermittent once  piperacillin/tazobactam IVPB.- 3.375 Gram(s) IV Intermittent once  piperacillin/tazobactam IVPB.. 3.375 Gram(s) IV Intermittent every

## 2024-02-22 NOTE — H&P ADULT - NSHPLABSRESULTS_GEN_ALL_CORE
LABS:                        12.5   12.57 )-----------( 347      ( 2024 22:37 )             35.8         139  |  110<H>  |  10  ----------------------------<  118<H>  4.0   |  26  |  0.69    Ca    9.0      2024 22:37    TPro  7.8  /  Alb  3.5  /  TBili  0.3  /  DBili  x   /  AST  19  /  ALT  45  /  AlkPhos  78      Urinalysis Basic - ( 2024 22:37 )    Color: x / Appearance: x / SG: x / pH: x  Gluc: 118 mg/dL / Ketone: x  / Bili: x / Urobili: x   Blood: x / Protein: x / Nitrite: x   Leuk Esterase: x / RBC: x / WBC x   Sq Epi: x / Non Sq Epi: x / Bacteria: x          RADIOLOGY STUDIES:  < from: US Transvaginal, OB (24 @ 01:30) >  ACC: [id number] EXAM:  US OB TRANSVAGINAL   ORDERED BY: DOTTY MARINO     PROCEDURE DATE:  2024          INTERPRETATION:  CLINICAL INFORMATION: Left-sided pelvic pain. Pregnant   female.    LMP: 2024    Beta-hC.    COMPARISON: 2009.    Endovaginal and transabdominal pelvic sonogram.    FINDINGS:  Uterus and endometrium: 9.7 x 6.2 x 8.4 cm. Rounded echogenic mass like   structure within the uterus which appears to be within the endometrium   measuring up to 3.4 x 3.1 x 2.8 cm.No definitive gestational sac.    Right ovary: 1.5 cm x 1.6 cm x 1.8 cm. Within normal limits. Normal   arterial and venous waveforms.  Left ovary: 1.6 cm x 2.0 cm x 1.4 cm. Within normal limits. Normal   arterial and venous waveforms.    Other: No suspicious adnexal mass.    Fluid: None.    IMPRESSION:    No definitive intrauterine gestational sac. Rounded echogenic mass like   structure within the uterus which appears to be within the endometrium   measuring up to 3.4 x 3.1 x 2.8 cm for which considerations include   hematoma, molar pregnancy, endometrial mass, or submucosal fibroid. No   suspicious adnexal mass. Follow-up sonogram in one week and serial   beta-hCG is recommended.    --- End of Report ---            ANTHONY VERMA DO; Attending Radiologist  This document has been electronically signed. 2024  2:10AM    < end of copied text >

## 2024-02-22 NOTE — CONSULT NOTE ADULT - SUBJECTIVE AND OBJECTIVE BOX
HPI:     38y   Last Menstrual Period 2024, presents with severe left pelvic pain that started 3 days ago and acutely worsened. Patient also reports vaginal bleeding for 3 days only requiring 1 pad per day. Patient describes the pain as throbbing pain. Patient reports pressure when urinating and frequency. Denies nausea, vomiting, diarrhea Denies fevers, chills, lightheadedness, dizziness, SOB, CP. No other concerns at this time.    PMHX; denies  PSHX; Csection x2  POBHX; Csecx2 (, ), sABx1, TOPx1  PGYNHX: History of bartholin cysts many years ago. Denies history of fibroids, ovarian cysts, STIs  SOCIAL: Denies use of alcohol, tobacco, or illicit drugs  Allergies: No Known Allergies  MEDS: denies    Vital Signs Last 24 Hrs  T(C): 36.8 (2024 01:56), Max: 36.9 (2024 21:15)  T(F): 98.3 (2024 01:56), Max: 98.5 (2024 21:15)  HR: 80 (2024 05:00) (74 - 85)  BP: 125/80 (2024 05:00) (101/63 - 130/68)  BP(mean): 94 (2024 05:00) (75 - 94)  RR: 18 (2024 05:00) (18 - 20)  SpO2: 97% (2024 05:00) (97% - 100%)    Parameters below as of 2024 05:00  Patient On (Oxygen Delivery Method): room air    PHYSICAL EXAM:  CHEST/LUNG: Clear to percussion bilaterally; No rales, rhonchi, wheezing, or rubs  HEART: Regular rate and rhythm; No murmurs, rubs, or gallops  ABDOMEN: Soft, Nontender, Nondistended; Bowel sounds present  EXTREMITIES: No clubbing, cyanosis, or edema  PELVIC:        EXTERNAL GENITALIA: Normal. No rashes or lesions noted.         VAGINA: healthy pink mucosa, no gross lesions, no discharge noted, no blood noted.          CERVIX: no lesions. closed, no active bleeding through os. no CMt noted.        UTERUS: normal size, nontender, mobile        ADNEXA: no adnexal masses or tenderness appreciated.    LABS:                        12.5   12.57 )-----------( 347      ( 2024 22:37 )             35.8     -    139  |  110<H>  |  10  ----------------------------<  118<H>  4.0   |  26  |  0.69    Ca    9.0      2024 22:37    TPro  7.8  /  Alb  3.5  /  TBili  0.3  /  DBili  x   /  AST  19  /  ALT  45  /  AlkPhos  78  -    Urinalysis Basic - ( 2024 22:37 )    Color: x / Appearance: x / SG: x / pH: x  Gluc: 118 mg/dL / Ketone: x  / Bili: x / Urobili: x   Blood: x / Protein: x / Nitrite: x   Leuk Esterase: x / RBC: x / WBC x   Sq Epi: x / Non Sq Epi: x / Bacteria: x          RADIOLOGY STUDIES:     HPI:     38y   Last Menstrual Period 2024, presents with severe left pelvic pain that started 3 days ago and acutely worsened. Patient also reports vaginal bleeding for 3 days only requiring 1 pad per day. Patient describes the pain as throbbing pain. Patient reports pressure when urinating and frequency. Denies nausea, vomiting, diarrhea Denies fevers, chills, lightheadedness, dizziness, SOB, CP. No other concerns at this time.    PMHX; denies  PSHX; Csection x2  POBHX; Csecx2 (, ), sABx1, TOPx1  PGYNHX: History of bartholin cysts many years ago. Denies history of fibroids, ovarian cysts, STIs  SOCIAL: Denies use of alcohol, tobacco, or illicit drugs  Allergies: No Known Allergies  MEDS: denies    Vital Signs Last 24 Hrs  T(C): 36.8 (2024 01:56), Max: 36.9 (2024 21:15)  T(F): 98.3 (2024 01:56), Max: 98.5 (2024 21:15)  HR: 80 (2024 05:00) (74 - 85)  BP: 125/80 (2024 05:00) (101/63 - 130/68)  BP(mean): 94 (2024 05:00) (75 - 94)  RR: 18 (2024 05:00) (18 - 20)  SpO2: 97% (2024 05:00) (97% - 100%)    Parameters below as of 2024 05:00  Patient On (Oxygen Delivery Method): room air    PHYSICAL EXAM:  CHEST/LUNG: Clear to percussion bilaterally; No rales, rhonchi, wheezing, or rubs  HEART: Regular rate and rhythm; No murmurs, rubs, or gallops  ABDOMEN: Soft, Nontender, Nondistended; Bowel sounds present  EXTREMITIES: No clubbing, cyanosis, or edema  PELVIC:        EXTERNAL GENITALIA: Normal. No rashes or lesions noted.         VAGINA: healthy pink mucosa, no gross lesions, no discharge noted, watery bloody fluid noted,          CERVIX: no lesions. closed, no active bleeding through os.  CMT noted.        UTERUS: normal size, tender, mobile        ADNEXA: no adnexal masses or tenderness appreciated.    LABS:                        12.5   12.57 )-----------( 347      ( 2024 22:37 )             35.8     -    139  |  110<H>  |  10  ----------------------------<  118<H>  4.0   |  26  |  0.69    Ca    9.0      2024 22:37    TPro  7.8  /  Alb  3.5  /  TBili  0.3  /  DBili  x   /  AST  19  /  ALT  45  /  AlkPhos  78  -    Urinalysis Basic - ( 2024 22:37 )    Color: x / Appearance: x / SG: x / pH: x  Gluc: 118 mg/dL / Ketone: x  / Bili: x / Urobili: x   Blood: x / Protein: x / Nitrite: x   Leuk Esterase: x / RBC: x / WBC x   Sq Epi: x / Non Sq Epi: x / Bacteria: x          RADIOLOGY STUDIES:     HPI:     38y   Last Menstrual Period 2024, presents with severe left pelvic pain that started 3 days ago and acutely worsened. Patient also reports vaginal bleeding for 3 days only requiring 1 pad per day. Patient describes the pain as throbbing pain. Patient reports pressure when urinating and frequency. Denies nausea, vomiting, diarrhea Denies fevers, chills, lightheadedness, dizziness, SOB, CP. No other concerns at this time.    PMHX; denies  PSHX; Csection x2  POBHX; Csecx2 (, ), sABx1, TOPx1  PGYNHX: History of bartholin cysts many years ago. Denies history of fibroids, ovarian cysts, STIs  SOCIAL: Denies use of alcohol, tobacco, or illicit drugs  Allergies: No Known Allergies  MEDS: denies    Vital Signs Last 24 Hrs  T(C): 36.8 (2024 01:56), Max: 36.9 (2024 21:15)  T(F): 98.3 (2024 01:56), Max: 98.5 (2024 21:15)  HR: 80 (2024 05:00) (74 - 85)  BP: 125/80 (2024 05:00) (101/63 - 130/68)  BP(mean): 94 (2024 05:00) (75 - 94)  RR: 18 (2024 05:00) (18 - 20)  SpO2: 97% (2024 05:00) (97% - 100%)    Parameters below as of 2024 05:00  Patient On (Oxygen Delivery Method): room air    PHYSICAL EXAM:  GEN: Moderately distressed due to pelvic pain  ABDOMEN: Soft, tenderness to palpation of lower abdomen, L>R, Nondistended, no rebound  PELVIC:        EXTERNAL GENITALIA: Normal. No rashes or lesions noted.         VAGINA: healthy pink mucosa, no gross lesions, watery bloody malodorous fluid noted,          CERVIX: no lesions. closed, no active bleeding through os.  CMT noted.        UTERUS: normal size, tender, mobile        ADNEXA: no adnexal masses or tenderness appreciated.    LABS:                        12.5   12.57 )-----------( 347      ( 2024 22:37 )             35.8         139  |  110<H>  |  10  ----------------------------<  118<H>  4.0   |  26  |  0.69    Ca    9.0      2024 22:37    TPro  7.8  /  Alb  3.5  /  TBili  0.3  /  DBili  x   /  AST  19  /  ALT  45  /  AlkPhos  78      Urinalysis Basic - ( 2024 22:37 )    Color: x / Appearance: x / SG: x / pH: x  Gluc: 118 mg/dL / Ketone: x  / Bili: x / Urobili: x   Blood: x / Protein: x / Nitrite: x   Leuk Esterase: x / RBC: x / WBC x   Sq Epi: x / Non Sq Epi: x / Bacteria: x    RADIOLOGY STUDIES:  TVUS   FINDINGS:  Uterus and endometrium: 9.7 x 6.2 x 8.4 cm. Rounded echogenic mass like   structure within the uterus which appears to be within the endometrium   measuring up to 3.4 x 3.1 x 2.8 cm.No definitive gestational sac.    Right ovary: 1.5 cm x 1.6 cm x 1.8 cm. Within normal limits. Normal   arterial and venous waveforms.  Left ovary: 1.6 cm x 2.0 cm x 1.4 cm. Within normal limits. Normal   arterial and venous waveforms.    Other: No suspicious adnexal mass.    Fluid: None.    IMPRESSION:    No definitive intrauterine gestational sac. Rounded echogenic mass like   structure within the uterus which appears to be within the endometrium   measuring up to 3.4 x 3.1 x 2.8 cm for which considerations include   hematoma, molar pregnancy, endometrial mass, or submucosal fibroid. No   suspicious adnexal mass. Follow-up sonogram in one week and serial   beta-hCG is recommended.

## 2024-02-22 NOTE — CONSULT NOTE ADULT - ATTENDING COMMENTS
Pt seen and examined.  Agree with note above  Pt feels better with toradol.  CT AP was done, awaiting final results.  Will admit for observation and serial exam.    PLEE Pt seen and examined.  Agree with note above  Disc with pt re toradol use and CT scan during pregnancy disc at this point benefits of pain control and diagnosis outweigh risks.  Pt feels better with toradol.  CT AP was done, awaiting final results.  Will admit for observation and serial exam.    PLEE

## 2024-02-22 NOTE — PATIENT PROFILE ADULT - FALL HARM RISK - HARM RISK INTERVENTIONS
Assistance with ambulation/Assistance OOB with selected safe patient handling equipment/Communicate Risk of Fall with Harm to all staff/Discuss with provider need for PT consult/Monitor gait and stability/Reinforce activity limits and safety measures with patient and family/Tailored Fall Risk Interventions/Visual Cue: Yellow wristband and red socks/Bed in lowest position, wheels locked, appropriate side rails in place/Call bell, personal items and telephone in reach/Instruct patient to call for assistance before getting out of bed or chair/Non-slip footwear when patient is out of bed/[place] to call system/Physically safe environment - no spills, clutter or unnecessary equipment/Purposeful Proactive Rounding/Room/bathroom lighting operational, light cord in reach

## 2024-02-22 NOTE — PATIENT PROFILE ADULT - FOOD INSECURITY
Gen: no acute distress, alert  HEENT: conjunctiva clear, no nasal discharge, MMM  CV: holosystolic murmur 3+, cap refill <2sec  Lungs: CLAB, no crackles, no wheezing, normal respiratory effort, no acrocyanosis, no central cyanosis  Chest: surgical scar w/ scab, clean and dry  : no rash  Ext: full ROMx4, WWP, pink  Skin: pink  Neuro: leanne+, good tone no

## 2024-02-22 NOTE — H&P ADULT - NSHPPHYSICALEXAM_GEN_ALL_CORE
Vital Signs Last 24 Hrs  T(C): 36.8 (22 Feb 2024 07:08), Max: 36.9 (21 Feb 2024 21:15)  T(F): 98.3 (22 Feb 2024 07:08), Max: 98.5 (21 Feb 2024 21:15)  HR: 74 (22 Feb 2024 07:08) (74 - 85)  BP: 116/71 (22 Feb 2024 07:08) (101/63 - 130/68)  BP(mean): 86 (22 Feb 2024 07:08) (75 - 94)  RR: 18 (22 Feb 2024 07:08) (18 - 20)  SpO2: 99% (22 Feb 2024 07:08) (97% - 100%)    Parameters below as of 22 Feb 2024 07:08  Patient On (Oxygen Delivery Method): room air       PHYSICAL EXAM:  GEN: Moderately distressed due to pelvic pain  ABDOMEN: Soft, tenderness to palpation of lower abdomen, Nondistended, no rebound tenderness or guarding  Ext: Nontender, non edematous   PELVIC: (done via overnight team)        EXTERNAL GENITALIA: Normal. No rashes or lesions noted.         VAGINA: healthy pink mucosa, no gross lesions, watery bloody malodorous fluid noted,          CERVIX: no lesions. closed, no active bleeding through os.  CMT noted.        UTERUS: normal size, tender, mobile        ADNEXA: no adnexal masses or tenderness appreciated

## 2024-02-22 NOTE — H&P ADULT - ATTENDING COMMENTS
39 yo P2 s/p D+E on 02/09 here c/o severe lower abdominal pain, found to have uterine mass, suspicious for 3 cm aborting myoma, patient also noted to have UTI, s/p Ceftriaxone, culture pending. HCG 58, will continue to trend. Patient started on Zosyn prophylactically, to get MRI today to better characterize mass. Patient admitted for prolonged observation, explained R/B/A of hysteroscopy D+C, patient verbalized understanding of risks involved, patient wishes to wait till tomorrow to decide if she wishes to move forward with the procedure as well as the timing of the procedure, currently will control her pain and treat with antibiotics. 39 yo P2 s/p D+E on 02/09 here c/o severe lower abdominal pain, found to have 3 cm uterine mass, suspicious for aborting myoma, patient also noted to have UTI, s/p Ceftriaxone, culture pending. HCG 58, Patient started on Zosyn prophylactically, to get MRI today to better characterize mass. Patient admitted for prolonged observation, explained R/B/A of hysteroscopy D+C, patient verbalized understanding of risks involved, patient wishes to wait till tomorrow to decide if she wishes to move forward with the procedure as well as the timing of the procedure, currently will control her pain with NSAIDs and treat with antibiotics. 39 yo P2 s/p D+E on 02/09 here c/o severe lower abdominal pain, found to have 3 cm uterine mass, suspicious for degenerating myoma, patient also noted to have UTI, s/p Ceftriaxone, culture pending. HCG 58, Patient started on Zosyn prophylactically, to get MRI today to better characterize mass. Patient admitted for prolonged observation, explained R/B/A of hysteroscopy D+C, patient verbalized understanding of risks involved, patient wishes to wait till tomorrow to decide if she wishes to move forward with the procedure as well as the timing of the procedure, currently will control her pain with NSAIDs and treat with antibiotics.

## 2024-02-22 NOTE — ED ADULT NURSE REASSESSMENT NOTE - NS ED NURSE REASSESS COMMENT FT1
Patient is tearful in 10/10 pain, As per patient requested I walked her to the bathroom and back to bed. MD made aware. MD will order morphine

## 2024-02-23 VITALS
RESPIRATION RATE: 16 BRPM | DIASTOLIC BLOOD PRESSURE: 87 MMHG | TEMPERATURE: 98 F | SYSTOLIC BLOOD PRESSURE: 116 MMHG | HEART RATE: 74 BPM | OXYGEN SATURATION: 100 %

## 2024-02-23 LAB
ANION GAP SERPL CALC-SCNC: 3 MMOL/L — LOW (ref 5–17)
BUN SERPL-MCNC: 7 MG/DL — SIGNIFICANT CHANGE UP (ref 7–23)
CALCIUM SERPL-MCNC: 8.1 MG/DL — LOW (ref 8.5–10.1)
CHLORIDE SERPL-SCNC: 115 MMOL/L — HIGH (ref 96–108)
CO2 SERPL-SCNC: 23 MMOL/L — SIGNIFICANT CHANGE UP (ref 22–31)
CREAT SERPL-MCNC: 0.63 MG/DL — SIGNIFICANT CHANGE UP (ref 0.5–1.3)
EGFR: 117 ML/MIN/1.73M2 — SIGNIFICANT CHANGE UP
GLUCOSE SERPL-MCNC: 85 MG/DL — SIGNIFICANT CHANGE UP (ref 70–99)
HCG SERPL-ACNC: 34 MIU/ML — HIGH
HCT VFR BLD CALC: 33.7 % — LOW (ref 34.5–45)
HGB BLD-MCNC: 11.8 G/DL — SIGNIFICANT CHANGE UP (ref 11.5–15.5)
MCHC RBC-ENTMCNC: 32.6 PG — SIGNIFICANT CHANGE UP (ref 27–34)
MCHC RBC-ENTMCNC: 35 GM/DL — SIGNIFICANT CHANGE UP (ref 32–36)
MCV RBC AUTO: 93.1 FL — SIGNIFICANT CHANGE UP (ref 80–100)
PLATELET # BLD AUTO: 285 K/UL — SIGNIFICANT CHANGE UP (ref 150–400)
POTASSIUM SERPL-MCNC: 4.1 MMOL/L — SIGNIFICANT CHANGE UP (ref 3.5–5.3)
POTASSIUM SERPL-SCNC: 4.1 MMOL/L — SIGNIFICANT CHANGE UP (ref 3.5–5.3)
RBC # BLD: 3.62 M/UL — LOW (ref 3.8–5.2)
RBC # FLD: 12.5 % — SIGNIFICANT CHANGE UP (ref 10.3–14.5)
SODIUM SERPL-SCNC: 141 MMOL/L — SIGNIFICANT CHANGE UP (ref 135–145)
WBC # BLD: 6.55 K/UL — SIGNIFICANT CHANGE UP (ref 3.8–10.5)
WBC # FLD AUTO: 6.55 K/UL — SIGNIFICANT CHANGE UP (ref 3.8–10.5)

## 2024-02-23 PROCEDURE — 99238 HOSP IP/OBS DSCHRG MGMT 30/<: CPT | Mod: GC

## 2024-02-23 RX ORDER — METRONIDAZOLE 500 MG
1 TABLET ORAL
Refills: 0 | DISCHARGE

## 2024-02-23 RX ORDER — METRONIDAZOLE 500 MG
1 TABLET ORAL
Qty: 14 | Refills: 0
Start: 2024-02-23 | End: 2024-02-29

## 2024-02-23 RX ORDER — ACETAMINOPHEN 500 MG
2 TABLET ORAL
Qty: 0 | Refills: 0 | DISCHARGE
Start: 2024-02-23

## 2024-02-23 RX ADMIN — Medication 1 TABLET(S): at 12:46

## 2024-02-23 RX ADMIN — SODIUM CHLORIDE 125 MILLILITER(S): 9 INJECTION, SOLUTION INTRAVENOUS at 00:29

## 2024-02-23 RX ADMIN — PIPERACILLIN AND TAZOBACTAM 25 GRAM(S): 4; .5 INJECTION, POWDER, LYOPHILIZED, FOR SOLUTION INTRAVENOUS at 14:53

## 2024-02-23 RX ADMIN — Medication 1000 MILLIGRAM(S): at 12:46

## 2024-02-23 RX ADMIN — PIPERACILLIN AND TAZOBACTAM 25 GRAM(S): 4; .5 INJECTION, POWDER, LYOPHILIZED, FOR SOLUTION INTRAVENOUS at 06:12

## 2024-02-23 NOTE — PROGRESS NOTE ADULT - ATTENDING COMMENTS
patient with central degenerating fibroid, UTI, BV positive, currently admitted for observation for pain management and on Zosyn, patient reports feeling much better today, no longer has any pain, was able to sleep through the night, WBC resolved, VSS, abdomen benign, findings explained to patient in detail, patient stable for discharge with close follow up as outpatient with her own GYN provider Dr Escalona in [place], patient aware she may need hysteroscopic myomectomy in the future if symptoms return to schedule with her own provider. Patient to be discharged with Augmentin and Metronidazole. ED precautions given. patient seen in the morning at bedside, patient with central degenerating fibroid, UTI, BV positive, currently admitted for observation for pain management and on Zosyn, patient reports feeling much better today, no longer has any pain, was able to sleep through the night, WBC resolved, VSS, abdomen benign, findings explained to patient in detail, patient stable for discharge with close follow up as outpatient with her own GYN provider Dr Escalona in [place], patient aware she may need hysteroscopic myomectomy in the future if symptoms return to schedule with her own provider. Patient to be discharged with Augmentin and Metronidazole. ED precautions given.

## 2024-02-23 NOTE — PROGRESS NOTE ADULT - ASSESSMENT
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    A/P:   -VSS, afebrile  -Patient currently asymptomatic. Continue with current pain regimen.  -MRI findings concerning for hemorrhage vs necrotic fibroid.  -Patient currently NPO in anticipation for possible OR for aborting myoma. Will reassess this morning.  -Pending GCCT and affirm results.  -WBC 12.57 > 6.55 > pending AM labs  -Continue with Zosyn    Dispo: Continue inpatient management BLANCA MARY is a 38y  Last Menstrual Period 2024, s/p D&E @ 16 wk GA on   admitted for acute pelvic pain with suspected degenerating myoma.    A/P:   -VSS, afebrile  -Patient currently asymptomatic. Continue with current pain regimen.  -MRI findings concerning for hemorrhage vs necrotic fibroid.  -Patient currently NPO in anticipation for possible OR for degenerating myoma. Will reassess this morning.  -Pending GCCT and affirm results.  -WBC 12.57 > 6.55 > pending AM labs  -Continue with Zosyn    Dispo: Continue inpatient management

## 2024-02-23 NOTE — DISCHARGE NOTE PROVIDER - HOSPITAL COURSE
BLANCA MARY is a 38y  Last Menstrual Period 2024, s/p D&E @ 16 wk GA on   admitted for acute pelvic pain with suspected aborting myoma, admitted for observation and pain management. Patient was seen by GYN team and found to have central degenerating fibroid, UTI, BV positive, treated with zosyn. WBC is not resolved, VSS, abdomen benign.   Patient seen in the morning at bedside and reports feeling much better today, no longer has any pain, was able to sleep through the night. Findings were explained to patient in detail, patient stable for discharge with close follow up as outpatient with her own GYN provider Dr Charles in [place], patient aware she may need hysteroscopic myomectomy in the future if symptoms return to schedule with her own provider. Patient to be discharged with Augmentin and Metronidazole. ED precautions given.     S:    No acute events overnight.   Patient was seen and examined at bedside.   Patient has no complaints this AM. Report pain is resolved. Patient last required pain medication at 9PM last night.  Denies N/V. Currently NPO.  Ambulating without difficulty.   + flatus/+BM/+ voiding  She denies fevers, chills, N/V/D, lightheadedness, dizziness, palpitations, chest pain, SOB, abdominal pain, vaginal bleeding or discharge, or urinary symptoms.    O:   T(C): 36.8 (24 @ 04:15), Max: 37.1 (24 @ 15:42)  HR: 78 (24 @ 04:15) (66 - 92)  BP: 112/71 (24 @ 04:15) (104/80 - 130/79)  RR: 16 (24 @ 04:15) (16 - 18)  SpO2: 99% (24 @ 04:15) (99% - 100%)    Gen: NAD, AAOx3  CV: RRR, S1/S2 present  Pulm: CTAB  Abdomen: Soft, nondistended, appropriately tender, + BS   Extremities: No calf tenderness or edema     Labs:                         12.5   12.57 )-----------( 347      ( 2024 22:37 )             35.8     Radiology    MRI FINDINGS:  UTERUS: Normal in size, shape and contour. Heterogeneous nonenhancing   area in the endometrium measuring 2.7 x 2.0 cm with mild T1 hyperintense   signal compatible with the presence of blood products.  RIGHT OVARY: Normal.  LEFT OVARY: Normal.  ADNEXA: Within normal limits.  BLADDER: Within normal limits.  LYMPH NODES: No pelvic lymphadenopathy.  VISUALIZED PORTIONS:  ABDOMINAL ORGANS: Within normal limits.  BOWEL: Within normal limits.  PERITONEUM: No ascites.  VESSELS: Within normal limits.  ABDOMINAL WALL: Within normal limits.  BONES: Within normal limits.  IMPRESSION:  2.7 x 2.0 cm nonenhancing area in the central uterus most likely   representing either hemorrhage or necrotic fibroid. Recommend follow-up   ultrasound in 4weeks to assess for possible resolution.  38y   Last Menstrual Period 2024, s/p D&E @ 16 wk GA on   presents with acute pelvic pain described as pelvic pressure concern for aborting myoma. On discharge, HCG was downtrending, vital signs were normal, and pain had resolved with conservative management.

## 2024-02-23 NOTE — DISCHARGE NOTE PROVIDER - NSDCCPCAREPLAN_GEN_ALL_CORE_FT
PRINCIPAL DISCHARGE DIAGNOSIS  Diagnosis: Uterine mass  Assessment and Plan of Treatment: You are now medically optiomized for discharge with close. Please follow up with you outpatient with her own GYN provider Dr Charles in [place]. You were informed thay you may need hysteroscopic myomectomy in the future. If symptoms return, schedule with your outpatient doctor.    Take you antibiotics metronidazole and augmentin as prescibed.   Please call 911 and og to ED if you develop increased vaginal bleeding, foul smelling vaginal discharge, fevers.

## 2024-02-23 NOTE — DISCHARGE NOTE PROVIDER - CARE PROVIDER_API CALL
RYAN UREÑA  [address]   [address]  [place] [address]  Phone: [phone]  Fax: [phone]  Established Patient  Follow Up Time:

## 2024-02-23 NOTE — DISCHARGE NOTE NURSING/CASE MANAGEMENT/SOCIAL WORK - NSDCPEFALRISK_GEN_ALL_CORE
For information on Fall & Injury Prevention, visit: [email or web address] OR  [email or web address] OR  https://www.cdc.gov/steadi/patient.html

## 2024-02-23 NOTE — DISCHARGE NOTE NURSING/CASE MANAGEMENT/SOCIAL WORK - PATIENT PORTAL LINK FT
You can access the FollowMyHealth Patient Portal offered by [facility] by registering at the following website: [email or web address]. By joining [facility]’s FollowMyHealth portal, you will also be able to view your health information using other applications (apps) compatible with our system.

## 2024-02-23 NOTE — PROGRESS NOTE ADULT - SUBJECTIVE AND OBJECTIVE BOX
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    S:    No acute events overnight.   Patient was seen and examined at bedside.   Patient has no complaints this AM. Report pain is resolved. Patient last required pain medication at 9PM last night.  Denies N/V. Currently NPO.  Ambulating without difficulty.   + flatus/+BM/+ voiding  She denies fevers, chills, N/V/D, lightheadedness, dizziness, palpitations, chest pain, SOB, abdominal pain, vaginal bleeding or discharge, or urinary symptoms.    O:   T(C): 36.8 (24 @ 04:15), Max: 37.1 (24 @ 15:42)  HR: 78 (24 @ 04:15) (66 - 92)  BP: 112/71 (24 @ 04:15) (104/80 - 130/79)  RR: 16 (24 @ 04:15) (16 - 18)  SpO2: 99% (24 @ 04:15) (99% - 100%)    Gen: NAD, AAOx3  CV: RRR, S1/S2 present  Pulm: CTAB  Abdomen: Soft, nondistended, appropriately tender, + BS   Extremities: No calf tenderness or edema     Labs:                         12.5   12.57 )-----------( 347      ( 2024 22:37 )             35.8     Radiology    MRI FINDINGS:  UTERUS: Normal in size, shape and contour. Heterogeneous nonenhancing   area in the endometrium measuring 2.7 x 2.0 cm with mild T1 hyperintense   signal compatible with the presence of blood products.    RIGHT OVARY: Normal.  LEFT OVARY: Normal.  ADNEXA: Within normal limits.    BLADDER: Within normal limits.    LYMPH NODES: No pelvic lymphadenopathy.    VISUALIZED PORTIONS:    ABDOMINAL ORGANS: Within normal limits.  BOWEL: Within normal limits.  PERITONEUM: No ascites.  VESSELS: Within normal limits.  ABDOMINAL WALL: Within normal limits.  BONES: Within normal limits.    IMPRESSION:  2.7 x 2.0 cm nonenhancing area in the central uterus most likely   representing either hemorrhage or necrotic fibroid. Recommend follow-up   ultrasound in 4weeks to assess for possible resolution.

## 2024-02-23 NOTE — DISCHARGE NOTE PROVIDER - NSDCMRMEDTOKEN_GEN_ALL_CORE_FT
acetaminophen 500 mg oral tablet: 2 tab(s) orally every 6 hours  Augmentin 875 mg-125 mg oral tablet: 1 tab(s) orally every 12 hours 10 days  metroNIDAZOLE 500 mg oral tablet: 1 tab(s) orally 2 times a day for 7 days  Multiple Vitamins oral tablet: 1 tab(s) orally once a day

## 2024-02-26 ENCOUNTER — EMERGENCY (EMERGENCY)
Facility: HOSPITAL | Age: 39
LOS: 0 days | Discharge: HOME | End: 2024-02-26
Attending: EMERGENCY MEDICINE
Payer: MEDICAID

## 2024-02-26 VITALS
SYSTOLIC BLOOD PRESSURE: 113 MMHG | TEMPERATURE: 98 F | RESPIRATION RATE: 18 BRPM | HEART RATE: 77 BPM | DIASTOLIC BLOOD PRESSURE: 89 MMHG | OXYGEN SATURATION: 100 %

## 2024-02-26 VITALS
DIASTOLIC BLOOD PRESSURE: 78 MMHG | RESPIRATION RATE: 18 BRPM | HEART RATE: 87 BPM | OXYGEN SATURATION: 99 % | SYSTOLIC BLOOD PRESSURE: 107 MMHG | HEIGHT: 59 IN | WEIGHT: 110.01 LBS | TEMPERATURE: 99 F

## 2024-02-26 DIAGNOSIS — D25.9 LEIOMYOMA OF UTERUS, UNSPECIFIED: ICD-10-CM

## 2024-02-26 DIAGNOSIS — Z3A.17 17 WEEKS GESTATION OF PREGNANCY: ICD-10-CM

## 2024-02-26 DIAGNOSIS — O26.892 OTHER SPECIFIED PREGNANCY RELATED CONDITIONS, SECOND TRIMESTER: ICD-10-CM

## 2024-02-26 DIAGNOSIS — O34.12 MATERNAL CARE FOR BENIGN TUMOR OF CORPUS UTERI, SECOND TRIMESTER: ICD-10-CM

## 2024-02-26 LAB
ALBUMIN SERPL ELPH-MCNC: 3.4 G/DL — SIGNIFICANT CHANGE UP (ref 3.3–5)
ALP SERPL-CCNC: 73 U/L — SIGNIFICANT CHANGE UP (ref 40–120)
ALT FLD-CCNC: 36 U/L — SIGNIFICANT CHANGE UP (ref 12–78)
ANION GAP SERPL CALC-SCNC: 5 MMOL/L — SIGNIFICANT CHANGE UP (ref 5–17)
APPEARANCE UR: CLEAR — SIGNIFICANT CHANGE UP
AST SERPL-CCNC: 18 U/L — SIGNIFICANT CHANGE UP (ref 15–37)
BACTERIA # UR AUTO: NEGATIVE /HPF — SIGNIFICANT CHANGE UP
BASOPHILS # BLD AUTO: 0.07 K/UL — SIGNIFICANT CHANGE UP (ref 0–0.2)
BASOPHILS NFR BLD AUTO: 0.8 % — SIGNIFICANT CHANGE UP (ref 0–2)
BILIRUB SERPL-MCNC: 0.2 MG/DL — SIGNIFICANT CHANGE UP (ref 0.2–1.2)
BILIRUB UR-MCNC: NEGATIVE — SIGNIFICANT CHANGE UP
BLD GP AB SCN SERPL QL: SIGNIFICANT CHANGE UP
BUN SERPL-MCNC: 13 MG/DL — SIGNIFICANT CHANGE UP (ref 7–23)
CALCIUM SERPL-MCNC: 9.1 MG/DL — SIGNIFICANT CHANGE UP (ref 8.5–10.1)
CAST: 1 /LPF — SIGNIFICANT CHANGE UP (ref 0–4)
CHLORIDE SERPL-SCNC: 109 MMOL/L — HIGH (ref 96–108)
CO2 SERPL-SCNC: 24 MMOL/L — SIGNIFICANT CHANGE UP (ref 22–31)
COD CRY URNS QL: PRESENT
COLOR SPEC: YELLOW — SIGNIFICANT CHANGE UP
CREAT SERPL-MCNC: 0.82 MG/DL — SIGNIFICANT CHANGE UP (ref 0.5–1.3)
DIFF PNL FLD: ABNORMAL
DIR ANTIGLOB POLYSPECIFIC INTERPRETATION: SIGNIFICANT CHANGE UP
EGFR: 94 ML/MIN/1.73M2 — SIGNIFICANT CHANGE UP
EOSINOPHIL # BLD AUTO: 0.21 K/UL — SIGNIFICANT CHANGE UP (ref 0–0.5)
EOSINOPHIL NFR BLD AUTO: 2.3 % — SIGNIFICANT CHANGE UP (ref 0–6)
GLUCOSE SERPL-MCNC: 124 MG/DL — HIGH (ref 70–99)
GLUCOSE UR QL: NEGATIVE MG/DL — SIGNIFICANT CHANGE UP
HCG SERPL-ACNC: 25 MIU/ML — HIGH
HCT VFR BLD CALC: 33.9 % — LOW (ref 34.5–45)
HGB BLD-MCNC: 11.9 G/DL — SIGNIFICANT CHANGE UP (ref 11.5–15.5)
IMM GRANULOCYTES NFR BLD AUTO: 0.3 % — SIGNIFICANT CHANGE UP (ref 0–0.9)
KETONES UR-MCNC: NEGATIVE MG/DL — SIGNIFICANT CHANGE UP
LEUKOCYTE ESTERASE UR-ACNC: ABNORMAL
LIDOCAIN IGE QN: 36 U/L — SIGNIFICANT CHANGE UP (ref 13–75)
LYMPHOCYTES # BLD AUTO: 3.15 K/UL — SIGNIFICANT CHANGE UP (ref 1–3.3)
LYMPHOCYTES # BLD AUTO: 35 % — SIGNIFICANT CHANGE UP (ref 13–44)
MCHC RBC-ENTMCNC: 32.5 PG — SIGNIFICANT CHANGE UP (ref 27–34)
MCHC RBC-ENTMCNC: 35.1 GM/DL — SIGNIFICANT CHANGE UP (ref 32–36)
MCV RBC AUTO: 92.6 FL — SIGNIFICANT CHANGE UP (ref 80–100)
MONOCYTES # BLD AUTO: 0.49 K/UL — SIGNIFICANT CHANGE UP (ref 0–0.9)
MONOCYTES NFR BLD AUTO: 5.4 % — SIGNIFICANT CHANGE UP (ref 2–14)
NEUTROPHILS # BLD AUTO: 5.05 K/UL — SIGNIFICANT CHANGE UP (ref 1.8–7.4)
NEUTROPHILS NFR BLD AUTO: 56.2 % — SIGNIFICANT CHANGE UP (ref 43–77)
NITRITE UR-MCNC: NEGATIVE — SIGNIFICANT CHANGE UP
PH UR: 5 — SIGNIFICANT CHANGE UP (ref 5–8)
PLATELET # BLD AUTO: 365 K/UL — SIGNIFICANT CHANGE UP (ref 150–400)
POTASSIUM SERPL-MCNC: 3.8 MMOL/L — SIGNIFICANT CHANGE UP (ref 3.5–5.3)
POTASSIUM SERPL-SCNC: 3.8 MMOL/L — SIGNIFICANT CHANGE UP (ref 3.5–5.3)
PROT SERPL-MCNC: 7.6 GM/DL — SIGNIFICANT CHANGE UP (ref 6–8.3)
PROT UR-MCNC: NEGATIVE MG/DL — SIGNIFICANT CHANGE UP
RBC # BLD: 3.66 M/UL — LOW (ref 3.8–5.2)
RBC # FLD: 12.4 % — SIGNIFICANT CHANGE UP (ref 10.3–14.5)
RBC CASTS # UR COMP ASSIST: 11 /HPF — HIGH (ref 0–4)
SODIUM SERPL-SCNC: 138 MMOL/L — SIGNIFICANT CHANGE UP (ref 135–145)
SP GR SPEC: 1.02 — SIGNIFICANT CHANGE UP (ref 1–1.03)
SQUAMOUS # UR AUTO: 6 /HPF — HIGH (ref 0–5)
UROBILINOGEN FLD QL: 0.2 MG/DL — SIGNIFICANT CHANGE UP (ref 0.2–1)
WBC # BLD: 9 K/UL — SIGNIFICANT CHANGE UP (ref 3.8–10.5)
WBC # FLD AUTO: 9 K/UL — SIGNIFICANT CHANGE UP (ref 3.8–10.5)
WBC UR QL: 4 /HPF — SIGNIFICANT CHANGE UP (ref 0–5)

## 2024-02-26 PROCEDURE — 86880 COOMBS TEST DIRECT: CPT

## 2024-02-26 PROCEDURE — 81001 URINALYSIS AUTO W/SCOPE: CPT

## 2024-02-26 PROCEDURE — 85025 COMPLETE CBC W/AUTO DIFF WBC: CPT

## 2024-02-26 PROCEDURE — 96375 TX/PRO/DX INJ NEW DRUG ADDON: CPT

## 2024-02-26 PROCEDURE — 99284 EMERGENCY DEPT VISIT MOD MDM: CPT | Mod: 25

## 2024-02-26 PROCEDURE — 76817 TRANSVAGINAL US OBSTETRIC: CPT | Mod: 26

## 2024-02-26 PROCEDURE — 99285 EMERGENCY DEPT VISIT HI MDM: CPT

## 2024-02-26 PROCEDURE — 80053 COMPREHEN METABOLIC PANEL: CPT

## 2024-02-26 PROCEDURE — 83690 ASSAY OF LIPASE: CPT

## 2024-02-26 PROCEDURE — 76817 TRANSVAGINAL US OBSTETRIC: CPT

## 2024-02-26 PROCEDURE — 84702 CHORIONIC GONADOTROPIN TEST: CPT

## 2024-02-26 PROCEDURE — 86850 RBC ANTIBODY SCREEN: CPT

## 2024-02-26 PROCEDURE — 86901 BLOOD TYPING SEROLOGIC RH(D): CPT

## 2024-02-26 PROCEDURE — 96374 THER/PROPH/DIAG INJ IV PUSH: CPT

## 2024-02-26 PROCEDURE — 86900 BLOOD TYPING SEROLOGIC ABO: CPT

## 2024-02-26 PROCEDURE — 36415 COLL VENOUS BLD VENIPUNCTURE: CPT

## 2024-02-26 RX ORDER — SODIUM CHLORIDE 9 MG/ML
1000 INJECTION INTRAMUSCULAR; INTRAVENOUS; SUBCUTANEOUS ONCE
Refills: 0 | Status: COMPLETED | OUTPATIENT
Start: 2024-02-26 | End: 2024-02-26

## 2024-02-26 RX ORDER — ONDANSETRON 8 MG/1
4 TABLET, FILM COATED ORAL ONCE
Refills: 0 | Status: COMPLETED | OUTPATIENT
Start: 2024-02-26 | End: 2024-02-26

## 2024-02-26 RX ORDER — KETOROLAC TROMETHAMINE 30 MG/ML
30 SYRINGE (ML) INJECTION ONCE
Refills: 0 | Status: DISCONTINUED | OUTPATIENT
Start: 2024-02-26 | End: 2024-02-26

## 2024-02-26 RX ORDER — HYDROMORPHONE HYDROCHLORIDE 2 MG/ML
1 INJECTION INTRAMUSCULAR; INTRAVENOUS; SUBCUTANEOUS ONCE
Refills: 0 | Status: DISCONTINUED | OUTPATIENT
Start: 2024-02-26 | End: 2024-02-26

## 2024-02-26 RX ORDER — MORPHINE SULFATE 50 MG/1
4 CAPSULE, EXTENDED RELEASE ORAL ONCE
Refills: 0 | Status: DISCONTINUED | OUTPATIENT
Start: 2024-02-26 | End: 2024-02-26

## 2024-02-26 RX ORDER — OXYCODONE HYDROCHLORIDE 5 MG/1
1 TABLET ORAL
Qty: 20 | Refills: 0
Start: 2024-02-26 | End: 2024-03-01

## 2024-02-26 RX ADMIN — SODIUM CHLORIDE 1000 MILLILITER(S): 9 INJECTION INTRAMUSCULAR; INTRAVENOUS; SUBCUTANEOUS at 07:58

## 2024-02-26 RX ADMIN — Medication 30 MILLIGRAM(S): at 10:46

## 2024-02-26 RX ADMIN — HYDROMORPHONE HYDROCHLORIDE 1 MILLIGRAM(S): 2 INJECTION INTRAMUSCULAR; INTRAVENOUS; SUBCUTANEOUS at 09:56

## 2024-02-26 RX ADMIN — ONDANSETRON 4 MILLIGRAM(S): 8 TABLET, FILM COATED ORAL at 07:58

## 2024-02-26 RX ADMIN — MORPHINE SULFATE 4 MILLIGRAM(S): 50 CAPSULE, EXTENDED RELEASE ORAL at 07:58

## 2024-02-26 NOTE — ED PROVIDER NOTE - OBJECTIVE STATEMENT
37 y/o female with PMHx of recent DNE for a 17-week pregnancy due to fibroids presents to the ED c/o pelvic pain (10/10 in severity) due to her fibroids. Denies vaginal bleeding.

## 2024-02-26 NOTE — ED PROVIDER NOTE - NSPTACCESSSVCSAPPT_ED_ALL_ED
Please review. Protocol failed / No Protocol. Requested Prescriptions   Pending Prescriptions Disp Refills    Tirzepatide (MOUNJARO) 2.5 MG/0.5ML Subcutaneous Solution Pen-injector 2 mL 0     Sig: Inject 2.5 mg into the skin once a week.        Diabetes Medication Protocol Failed - 9/16/2023 11:52 AM        Failed - Last A1C < 7.5 and within past 6 months     Lab Results   Component Value Date    A1C 8.9 (A) 08/30/2023             Passed - In person appointment or virtual visit in the past 6 mos or appointment in next 3 mos     Recent Outpatient Visits              2 weeks ago Type 2 diabetes mellitus with stage 2 chronic kidney disease, without long-term current use of insulin     [facility] [address] [place] [address], [place] Link, [place]    Office Visit    6 months ago Wellness examination    [address] [facility], [place] Lyn[place] Link, MD    Office Visit    1 year ago High cholesterol    [facility], [place] Link, [place]    Office Visit    1 year ago Recurrent major depressive disorder, in partial remission [facility])    [facility], [address], [place] Link, [place]    Office Visit    2 years ago Uncontrolled type 2 diabetes mellitus with hyperglycemia [facility])    [facility], [address], [place] Link, [place]    Office Visit          Future Appointments         Provider Department Appt Notes    In 3 weeks [facility] [address], [place] 6 wk fu                    Passed - [facility] or GFRNAA > 50     GFR Evaluation  EGFRCR: 63 , resulted on 2/20/2023          Passed - GFR in the past 12 months Specialty Care (immediate)...

## 2024-02-26 NOTE — ED PROVIDER NOTE - CLINICAL SUMMARY MEDICAL DECISION MAKING FREE TEXT BOX
Pain control with morphine, labs to trend HCG, and ultrasound r/o RPOCs vs. fibroids vs. cysts.    11:30 AM - Patient comfortable, reviewed labs and studies, will discharge to followup with GYN.

## 2024-02-26 NOTE — ED PROVIDER NOTE - NSFOLLOWUPINSTRUCTIONS_ED_ALL_ED_FT
Uterine Fibroids    WHAT YOU NEED TO KNOW:    What are uterine fibroids? Uterine fibroids are growths found inside your uterus. Uterine fibroids are benign (not cancer) and may also be called myomas or leiomyomas. Uterine fibroids often appear in groups, or you may have only one. They can be small or large, and they can grow. Fibroids likely will not spread to other parts of your body. They may grow when you are pregnant and shrink after you no longer have a monthly period.  Uterine Fibroid    What increases my risk for uterine fibroids? The cause of uterine fibroids is not clear. Ask your healthcare provider about these and other risk factors for uterine fibroids:    A family history of uterine fibroids    Increased hormone levels    Menstrual periods starting before age 13    Too much body weight    Not having children    Drinking alcohol  What are the signs and symptoms of uterine fibroids? Symptoms depend on the size, type, and number of fibroids you have. Symptoms also depend on where the fibroids are inside your uterus:    Heavy or painful menstrual bleeding that lasts more than 1 week    Pelvic pressure and pain    Urge to urinate often    Constipation or pain when you have a bowel movement    Increased pelvic pain during sex  How are uterine fibroids diagnosed? Your healthcare provider will examine you and ask about your symptoms. Tell the provider if any women if your family have had uterine fibroids. You may also need any of the following:    A pelvic exam is also called an internal or vaginal exam. During a pelvic exam, a speculum is gently placed into your vagina. A speculum is a tool that opens your vagina. This lets your provider see your cervix (bottom part of your uterus). With gloved hands, your provider will check the size and shape of your uterus and ovaries.    An ultrasound uses sound waves to show pictures on a monitor. An ultrasound may be done to show your uterus and fibroids. The ultrasound device may be moved over your abdomen. Instead, the device may be placed in your vagina.    A biopsy is a tissue sample of a fibroid that your healthcare provider takes from your uterus for testing.  How are uterine fibroids treated? Watchful waiting may be recommended if your signs and symptoms are mild. The following treatments may shrink your fibroids and decrease your symptoms:    Medicines:  Medicines that decrease hormones may help shrink your fibroids and decrease menstrual bleeding.    Oral contraceptives can help control menstrual bleeding.    NSAIDs help decrease swelling and pain or fever. This medicine is available with or without a doctor's order. NSAIDs can cause stomach bleeding or kidney problems in certain people. If you take blood thinner medicine, always ask your healthcare provider if NSAIDs are safe for you. Always read the medicine label and follow directions.    A procedure may be done to stop blood flow to the fibroids to help shrink them. This will help decrease your symptoms.    Surgery may be used to remove your fibroids and leave your uterus in place. Surgery may instead be used to remove your fibroids and uterus.  How can I help prevent uterine fibroids?    Maintain a healthy weight. Extra weight can increase your risk for fibroids. Talk to your healthcare provider about a healthy weight for you. Your provider can help you create a healthy weight loss plan, if needed.    Eat a variety of healthy foods. Fruits and vegetables are especially important to help lower the risk for fibroid. Other healthy foods include whole-grain breads, low-fat dairy products, beans, lean meats, and fish. Your healthcare provider or a dietitian can help you create a healthy meal plan.  Healthy Foods      Limit or do not drink alcohol, as directed. Alcohol can increase your risk for fibroids. A drink of alcohol is 12 ounces of beer, 1½ ounces of liquor, or 5 ounces of wine. Ask your healthcare provider for information if you need help to quit drinking alcohol.  When should I seek immediate care?    Your heart begins to race, and you feel faint.    You begin to pass large blood clots from your vagina.  When should I call my doctor or gynecologist?    Your symptoms do not go away, or they get worse.    You feel weak and are more tired than usual.    You do not feel like your bladder is empty after you urinate. You also may urinate small amounts more often.    You have questions or concerns about your condition or care.  CARE AGREEMENT:    You have the right to help plan your care. Learn about your health condition and how it may be treated. Discuss treatment options with your healthcare providers to decide what care you want to receive. You always have the right to refuse treatment.    © [employer] 1973, 2024    	  back to top            © [employer] 1973, 2024

## 2024-02-26 NOTE — ED ADULT NURSE NOTE - CHIEF COMPLAINT QUOTE
pt brought in by EMS  for severe abdominal pain. pt is 17 weeks pregnant as per EMS. pt unsure if bleeding. states feels wet. no other complaints at this time

## 2024-02-26 NOTE — ED ADULT TRIAGE NOTE - CHIEF COMPLAINT QUOTE
pt brought in by EMS  for severe abdominal pain. pt is 17 weeks pregnant as per EMS. pt unsure if bleeding. states feels wet. no other complaints at this time pt brought in by EMS  for severe abdominal pain. pt is 17 weeks pregnant as per EMS. pt unsure if bleeding. states feels wet. no other complaints at this time. MD made aware

## 2024-02-26 NOTE — ED PROVIDER NOTE - PHYSICAL EXAMINATION
Gen:  Well appearing in NAD  Head:  NC/AT  HEENT: pupils perrl,no pharyngeal erythema, uvula midline  Cardiac: S1S2, RRR  Abd: Soft, non tender  Resp: No distress, CTA   musculoskeletal: no deformities, no swelling, strength +5/+5. +Midline pelvis is tender.  Skin: warm and dry as visualized, no rashes  Neuro: OSORIO, aao x 4  Psych:alert, cooperative, appropriate mood and affect for situation

## 2024-02-26 NOTE — ED PROVIDER NOTE - PATIENT PORTAL LINK FT
You can access the FollowMyHealth Patient Portal offered by [facility] by registering at the following website: [email or web address]. By joining [facility]’s FollowMyHealth portal, you will also be able to view your health information using other applications (apps) compatible with our system.

## 2024-02-26 NOTE — ED ADULT NURSE NOTE - OBJECTIVE STATEMENT
pt is 39 yo female presents to ED c/o "fibroid pain," pt reports she lost her baby and had D+C procedure recently. Pt was here few days ago, recommended surgery to remove the fibroid.  pt alert, appears to be in distress due to pain, states laying on her stomach is making her feel better. pt is 37 yo female presents to ED c/o "fibroid pain," pt reports she lost her baby and had D+E procedure recently. Pt was here few days ago, recommended surgery to remove the fibroid.  pt alert, appears to be in distress due to pain, states laying on her stomach is making her feel better.

## 2024-02-27 LAB
CULTURE RESULTS: SIGNIFICANT CHANGE UP
CULTURE RESULTS: SIGNIFICANT CHANGE UP
SPECIMEN SOURCE: SIGNIFICANT CHANGE UP
SPECIMEN SOURCE: SIGNIFICANT CHANGE UP

## 2024-02-29 DIAGNOSIS — N85.9 NONINFLAMMATORY DISORDER OF UTERUS, UNSPECIFIED: ICD-10-CM

## 2024-02-29 DIAGNOSIS — O34.91 MATERNAL CARE FOR ABNORMALITY OF PELVIC ORGAN, UNSPECIFIED, FIRST TRIMESTER: ICD-10-CM

## 2024-02-29 DIAGNOSIS — N76.0 ACUTE VAGINITIS: ICD-10-CM

## 2024-02-29 DIAGNOSIS — Z3A.01 LESS THAN 8 WEEKS GESTATION OF PREGNANCY: ICD-10-CM

## 2024-02-29 DIAGNOSIS — D25.9 LEIOMYOMA OF UTERUS, UNSPECIFIED: ICD-10-CM

## 2024-02-29 DIAGNOSIS — N39.0 URINARY TRACT INFECTION, SITE NOT SPECIFIED: ICD-10-CM

## 2024-02-29 DIAGNOSIS — O23.591 INFECTION OF OTHER PART OF GENITAL TRACT IN PREGNANCY, FIRST TRIMESTER: ICD-10-CM

## 2024-02-29 DIAGNOSIS — O23.41 UNSPECIFIED INFECTION OF URINARY TRACT IN PREGNANCY, FIRST TRIMESTER: ICD-10-CM

## 2024-02-29 DIAGNOSIS — O34.11 MATERNAL CARE FOR BENIGN TUMOR OF CORPUS UTERI, FIRST TRIMESTER: ICD-10-CM

## 2024-08-20 NOTE — ED ADULT NURSE NOTE - TEMPLATE
.  .                                                      At [facility], one important tool we use to improve our patient services is our Patient Survey.  Following your visit you may receive our survey in the mail or by email.    Please take the time to complete the survey.    If your visit with us was great, we want to hear about it.    If we can improve, please let us know how.          Cold/Sinus